# Patient Record
Sex: FEMALE | Race: WHITE | NOT HISPANIC OR LATINO | Employment: OTHER | URBAN - METROPOLITAN AREA
[De-identification: names, ages, dates, MRNs, and addresses within clinical notes are randomized per-mention and may not be internally consistent; named-entity substitution may affect disease eponyms.]

---

## 2017-06-28 ENCOUNTER — TELEPHONE (OUTPATIENT)
Dept: FAMILY MEDICINE | Facility: CLINIC | Age: 77
End: 2017-06-28

## 2017-06-28 NOTE — TELEPHONE ENCOUNTER
----- Message from Hien Gaye sent at 6/28/2017  2:07 PM CDT -----  Contact: self, [phone]  Patient has an appointment scheduled on 10/2 but states she was told to come back in February and requests to be seen sooner if possible. Please advise.

## 2017-06-28 NOTE — TELEPHONE ENCOUNTER
Called patient and offered earlier appointment with another Dr. Patient decided to wait and is requesting to get on waiting list. Patient verbalized understanding.

## 2021-01-26 ENCOUNTER — HOSPITAL ENCOUNTER (EMERGENCY)
Facility: HOSPITAL | Age: 81
Discharge: HOME | End: 2021-01-26
Attending: EMERGENCY MEDICINE
Payer: MEDICARE

## 2021-01-26 VITALS
HEIGHT: 63 IN | OXYGEN SATURATION: 96 % | WEIGHT: 98 LBS | HEART RATE: 89 BPM | BODY MASS INDEX: 17.36 KG/M2 | TEMPERATURE: 98 F | RESPIRATION RATE: 17 BRPM | DIASTOLIC BLOOD PRESSURE: 81 MMHG | SYSTOLIC BLOOD PRESSURE: 176 MMHG
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
## 2021-01-26 DIAGNOSIS — M79.641 RIGHT HAND PAIN: ICD-10-CM

## 2021-01-26 DIAGNOSIS — R51.9 FRONTAL HEADACHE: ICD-10-CM

## 2021-01-26 DIAGNOSIS — M25.572 LEFT LATERAL ANKLE PAIN: ICD-10-CM

## 2021-01-26 DIAGNOSIS — M25.551 RIGHT HIP PAIN: ICD-10-CM

## 2021-01-26 DIAGNOSIS — M25.561 RIGHT KNEE PAIN: ICD-10-CM

## 2021-01-26 PROCEDURE — 99285 EMERGENCY DEPT VISIT HI MDM: CPT | Mod: 25

## 2021-01-26 PROCEDURE — 25000003 PHARM REV CODE 250: Performed by: EMERGENCY MEDICINE

## 2021-01-26 RX ORDER — IBUPROFEN 600 MG/1
600 TABLET ORAL
Status: COMPLETED | OUTPATIENT
Start: 2021-01-26 | End: 2021-01-26

## 2021-01-26 RX ADMIN — IBUPROFEN 600 MG: 600 TABLET, FILM COATED ORAL at 02:01

## 2021-08-25 ENCOUNTER — LAB VISIT (OUTPATIENT)
Dept: LAB | Facility: HOSPITAL | Age: 81
End: 2021-08-25
Attending: FAMILY MEDICINE
Payer: MEDICARE

## 2021-08-25 DIAGNOSIS — D64.9 ANEMIA, UNSPECIFIED TYPE: ICD-10-CM

## 2021-08-25 DIAGNOSIS — I10 BENIGN ESSENTIAL HYPERTENSION: ICD-10-CM

## 2021-08-25 LAB
ALBUMIN SERPL BCP-MCNC: 3.8 G/DL (ref 3.5–5.2)
ALP SERPL-CCNC: 64 U/L (ref 55–135)
ALT SERPL W/O P-5'-P-CCNC: 9 U/L (ref 10–44)
ANION GAP SERPL CALC-SCNC: 10 MMOL/L (ref 8–16)
AST SERPL-CCNC: 16 U/L (ref 10–40)
BASOPHILS # BLD AUTO: 0.03 K/UL (ref 0–0.2)
BASOPHILS NFR BLD: 0.5 % (ref 0–1.9)
BILIRUB SERPL-MCNC: 0.4 MG/DL (ref 0.1–1)
BUN SERPL-MCNC: 26 MG/DL (ref 8–23)
CALCIUM SERPL-MCNC: 9.7 MG/DL (ref 8.7–10.5)
CHLORIDE SERPL-SCNC: 107 MMOL/L (ref 95–110)
CO2 SERPL-SCNC: 25 MMOL/L (ref 23–29)
CREAT SERPL-MCNC: 1.1 MG/DL (ref 0.5–1.4)
DIFFERENTIAL METHOD: ABNORMAL
EOSINOPHIL # BLD AUTO: 0.1 K/UL (ref 0–0.5)
EOSINOPHIL NFR BLD: 0.9 % (ref 0–8)
ERYTHROCYTE [DISTWIDTH] IN BLOOD BY AUTOMATED COUNT: 12.5 % (ref 11.5–14.5)
EST. GFR  (AFRICAN AMERICAN): 54 ML/MIN/1.73 M^2
EST. GFR  (NON AFRICAN AMERICAN): 47 ML/MIN/1.73 M^2
FERRITIN SERPL-MCNC: 198 NG/ML (ref 20–300)
FOLATE SERPL-MCNC: 10.8 NG/ML (ref 4–24)
GLUCOSE SERPL-MCNC: 110 MG/DL (ref 70–110)
HCT VFR BLD AUTO: 33.5 % (ref 37–48.5)
HGB BLD-MCNC: 10.6 G/DL (ref 12–16)
IMM GRANULOCYTES # BLD AUTO: 0.02 K/UL (ref 0–0.04)
IMM GRANULOCYTES NFR BLD AUTO: 0.3 % (ref 0–0.5)
IRON SERPL-MCNC: 64 UG/DL (ref 30–160)
LYMPHOCYTES # BLD AUTO: 1.6 K/UL (ref 1–4.8)
LYMPHOCYTES NFR BLD: 28 % (ref 18–48)
MCH RBC QN AUTO: 29.9 PG (ref 27–31)
MCHC RBC AUTO-ENTMCNC: 31.6 G/DL (ref 32–36)
MCV RBC AUTO: 95 FL (ref 82–98)
MONOCYTES # BLD AUTO: 0.4 K/UL (ref 0.3–1)
MONOCYTES NFR BLD: 6.8 % (ref 4–15)
NEUTROPHILS # BLD AUTO: 3.6 K/UL (ref 1.8–7.7)
NEUTROPHILS NFR BLD: 63.5 % (ref 38–73)
NRBC BLD-RTO: 0 /100 WBC
PLATELET # BLD AUTO: 189 K/UL (ref 150–450)
PMV BLD AUTO: 9.5 FL (ref 9.2–12.9)
POTASSIUM SERPL-SCNC: 4 MMOL/L (ref 3.5–5.1)
PROT SERPL-MCNC: 7.4 G/DL (ref 6–8.4)
RBC # BLD AUTO: 3.54 M/UL (ref 4–5.4)
SATURATED IRON: 18 % (ref 20–50)
SODIUM SERPL-SCNC: 142 MMOL/L (ref 136–145)
TOTAL IRON BINDING CAPACITY: 355 UG/DL (ref 250–450)
TRANSFERRIN SERPL-MCNC: 240 MG/DL (ref 200–375)
TSH SERPL DL<=0.005 MIU/L-ACNC: 1.58 UIU/ML (ref 0.4–4)
VIT B12 SERPL-MCNC: 343 PG/ML (ref 210–950)
WBC # BLD AUTO: 5.74 K/UL (ref 3.9–12.7)

## 2021-08-25 PROCEDURE — 82746 ASSAY OF FOLIC ACID SERUM: CPT | Performed by: FAMILY MEDICINE

## 2021-08-25 PROCEDURE — 80053 COMPREHEN METABOLIC PANEL: CPT | Performed by: FAMILY MEDICINE

## 2021-08-25 PROCEDURE — 82607 VITAMIN B-12: CPT | Performed by: FAMILY MEDICINE

## 2021-08-25 PROCEDURE — 83540 ASSAY OF IRON: CPT | Performed by: FAMILY MEDICINE

## 2021-08-25 PROCEDURE — 84443 ASSAY THYROID STIM HORMONE: CPT | Performed by: FAMILY MEDICINE

## 2021-08-25 PROCEDURE — 85025 COMPLETE CBC W/AUTO DIFF WBC: CPT | Performed by: FAMILY MEDICINE

## 2021-08-25 PROCEDURE — 82728 ASSAY OF FERRITIN: CPT | Performed by: FAMILY MEDICINE

## 2021-08-25 PROCEDURE — 36415 COLL VENOUS BLD VENIPUNCTURE: CPT | Performed by: FAMILY MEDICINE

## 2021-12-22 ENCOUNTER — HOSPITAL ENCOUNTER (EMERGENCY)
Facility: HOSPITAL | Age: 81
Discharge: HOME | End: 2021-12-23
Attending: EMERGENCY MEDICINE
Payer: MEDICARE

## 2021-12-22 VITALS
WEIGHT: 90 LBS | SYSTOLIC BLOOD PRESSURE: 157 MMHG | RESPIRATION RATE: 16 BRPM | BODY MASS INDEX: 15.95 KG/M2 | TEMPERATURE: 98 F | DIASTOLIC BLOOD PRESSURE: 69 MMHG | HEART RATE: 105 BPM | HEIGHT: 63 IN | OXYGEN SATURATION: 99 %

## 2021-12-22 DIAGNOSIS — R55 VASOVAGAL SYNCOPE: Primary | ICD-10-CM

## 2021-12-22 LAB
ALBUMIN SERPL BCP-MCNC: 3.8 G/DL (ref 3.5–5.2)
ALP SERPL-CCNC: 82 U/L (ref 55–135)
ALT SERPL W/O P-5'-P-CCNC: 7 U/L (ref 10–44)
ANION GAP SERPL CALC-SCNC: 12 MMOL/L (ref 8–16)
AST SERPL-CCNC: 15 U/L (ref 10–40)
BASOPHILS # BLD AUTO: 0.03 K/UL (ref 0–0.2)
BASOPHILS NFR BLD: 0.5 % (ref 0–1.9)
BILIRUB SERPL-MCNC: 0.4 MG/DL (ref 0.1–1)
BILIRUB UR QL STRIP: NEGATIVE
BUN SERPL-MCNC: 36 MG/DL (ref 8–23)
CALCIUM SERPL-MCNC: 10.2 MG/DL (ref 8.7–10.5)
CHLORIDE SERPL-SCNC: 104 MMOL/L (ref 95–110)
CLARITY UR REFRACT.AUTO: CLEAR
CO2 SERPL-SCNC: 21 MMOL/L (ref 23–29)
COLOR UR AUTO: YELLOW
CREAT SERPL-MCNC: 1.3 MG/DL (ref 0.5–1.4)
DIFFERENTIAL METHOD: ABNORMAL
EOSINOPHIL # BLD AUTO: 0 K/UL (ref 0–0.5)
EOSINOPHIL NFR BLD: 0.7 % (ref 0–8)
ERYTHROCYTE [DISTWIDTH] IN BLOOD BY AUTOMATED COUNT: 12.4 % (ref 11.5–14.5)
EST. GFR  (AFRICAN AMERICAN): 43.8 ML/MIN/1.73 M^2
EST. GFR  (NON AFRICAN AMERICAN): 38 ML/MIN/1.73 M^2
GLUCOSE SERPL-MCNC: 122 MG/DL (ref 70–110)
GLUCOSE UR QL STRIP: NEGATIVE
HCT VFR BLD AUTO: 38 % (ref 37–48.5)
HGB BLD-MCNC: 11.8 G/DL (ref 12–16)
HGB UR QL STRIP: ABNORMAL
HYALINE CASTS UR QL AUTO: 8 /LPF
IMM GRANULOCYTES # BLD AUTO: 0.01 K/UL (ref 0–0.04)
IMM GRANULOCYTES NFR BLD AUTO: 0.2 % (ref 0–0.5)
KETONES UR QL STRIP: NEGATIVE
LEUKOCYTE ESTERASE UR QL STRIP: NEGATIVE
LYMPHOCYTES # BLD AUTO: 1.9 K/UL (ref 1–4.8)
LYMPHOCYTES NFR BLD: 31.2 % (ref 18–48)
MAGNESIUM SERPL-MCNC: 1.5 MG/DL (ref 1.6–2.6)
MCH RBC QN AUTO: 29.7 PG (ref 27–31)
MCHC RBC AUTO-ENTMCNC: 31.1 G/DL (ref 32–36)
MCV RBC AUTO: 96 FL (ref 82–98)
MICROSCOPIC COMMENT: ABNORMAL
MONOCYTES # BLD AUTO: 0.5 K/UL (ref 0.3–1)
MONOCYTES NFR BLD: 7.5 % (ref 4–15)
NEUTROPHILS # BLD AUTO: 3.6 K/UL (ref 1.8–7.7)
NEUTROPHILS NFR BLD: 59.9 % (ref 38–73)
NITRITE UR QL STRIP: NEGATIVE
NRBC BLD-RTO: 0 /100 WBC
PH UR STRIP: 5 [PH] (ref 5–8)
PLATELET # BLD AUTO: 193 K/UL (ref 150–450)
PMV BLD AUTO: 9.9 FL (ref 9.2–12.9)
POTASSIUM SERPL-SCNC: 3.5 MMOL/L (ref 3.5–5.1)
PROT SERPL-MCNC: 7.6 G/DL (ref 6–8.4)
PROT UR QL STRIP: NEGATIVE
RBC # BLD AUTO: 3.97 M/UL (ref 4–5.4)
RBC #/AREA URNS AUTO: 1 /HPF (ref 0–4)
SODIUM SERPL-SCNC: 137 MMOL/L (ref 136–145)
SP GR UR STRIP: 1.02 (ref 1–1.03)
SQUAMOUS #/AREA URNS AUTO: 0 /HPF
TROPONIN I SERPL DL<=0.01 NG/ML-MCNC: <0.006 NG/ML (ref 0–0.03)
URN SPEC COLLECT METH UR: ABNORMAL
WBC # BLD AUTO: 6.02 K/UL (ref 3.9–12.7)

## 2021-12-22 PROCEDURE — 25000003 PHARM REV CODE 250: Performed by: EMERGENCY MEDICINE

## 2021-12-22 PROCEDURE — 85025 COMPLETE CBC W/AUTO DIFF WBC: CPT | Performed by: EMERGENCY MEDICINE

## 2021-12-22 PROCEDURE — 81001 URINALYSIS AUTO W/SCOPE: CPT | Performed by: EMERGENCY MEDICINE

## 2021-12-22 PROCEDURE — 99284 EMERGENCY DEPT VISIT MOD MDM: CPT | Mod: 25

## 2021-12-22 PROCEDURE — 96361 HYDRATE IV INFUSION ADD-ON: CPT

## 2021-12-22 PROCEDURE — 80053 COMPREHEN METABOLIC PANEL: CPT | Performed by: EMERGENCY MEDICINE

## 2021-12-22 PROCEDURE — 84484 ASSAY OF TROPONIN QUANT: CPT | Performed by: EMERGENCY MEDICINE

## 2021-12-22 PROCEDURE — 96365 THER/PROPH/DIAG IV INF INIT: CPT

## 2021-12-22 PROCEDURE — 83735 ASSAY OF MAGNESIUM: CPT | Performed by: EMERGENCY MEDICINE

## 2021-12-22 PROCEDURE — 99285 PR EMERGENCY DEPT VISIT,LEVEL V: ICD-10-PCS | Mod: ,,, | Performed by: EMERGENCY MEDICINE

## 2021-12-22 PROCEDURE — 63600175 PHARM REV CODE 636 W HCPCS: Performed by: EMERGENCY MEDICINE

## 2021-12-22 PROCEDURE — 99285 EMERGENCY DEPT VISIT HI MDM: CPT | Mod: ,,, | Performed by: EMERGENCY MEDICINE

## 2021-12-22 RX ORDER — MAGNESIUM SULFATE HEPTAHYDRATE 40 MG/ML
2 INJECTION, SOLUTION INTRAVENOUS
Status: COMPLETED | OUTPATIENT
Start: 2021-12-22 | End: 2021-12-22

## 2021-12-22 RX ADMIN — SODIUM CHLORIDE 500 ML: 0.9 INJECTION, SOLUTION INTRAVENOUS at 10:12

## 2021-12-22 RX ADMIN — MAGNESIUM SULFATE 2 G: 2 INJECTION INTRAVENOUS at 09:12

## 2021-12-22 RX ADMIN — SODIUM CHLORIDE 500 ML: 0.9 INJECTION, SOLUTION INTRAVENOUS at 08:12

## 2021-12-23 PROBLEM — N18.32 STAGE 3B CHRONIC KIDNEY DISEASE: Status: ACTIVE | Noted: 2021-12-23

## 2021-12-23 PROBLEM — S32.000A COMPRESSION FRACTURE OF LUMBAR VERTEBRA: Status: ACTIVE | Noted: 2021-12-23

## 2021-12-23 PROBLEM — N18.32 STAGE 3B CHRONIC KIDNEY DISEASE: Chronic | Status: ACTIVE | Noted: 2021-12-23

## 2021-12-23 NOTE — PROVIDER PROGRESS NOTES - EMERGENCY DEPT.
Encounter Date: 12/22/2021    ED Physician Progress Notes        Physician Note:   Received signout from Dr. Kapoor.  Plan was follow-up urine and reassess.  Patient resting in bed no acute distress labs reviewed no acute process identified.  Patient sources a history of syncope in reports this is similar.  Family reports patient Had recent echo which was reassuring.  Family also reports patient is recovering from diarrheal illness, possibly dehydrated from that.  She denies any complaints at this time.  Ready for discharge.  Discussed with family results.  Patient will be discharged strict return precautions discussed will follow-up with PCP.

## 2021-12-23 NOTE — ED TRIAGE NOTES
"Pt presents to the ED via EMS for syncopal episode she had today while [place] shopping. Per daughter: "She told me she wasn't feeling good then she just fell into my arms". Daughter said she was down for a few minutes. EMS took an EKG en route and it showed she had a STEMI, EKG on arrival did not show this. Denies chest pain, SOB, dizziness, or lightheadedness. Had a syncopal episode in 2015.   "

## 2021-12-23 NOTE — ED PROVIDER NOTES
Encounter Date: 12/22/2021       History     Chief Complaint   Patient presents with    Loss of Consciousness     [place] mall with [place]. They reported pt had syncopal episode 1.5 hours ago. She was awake upon EMS arrival. She denies any complaints or pain, head injury.      A 81-year-old female was at the mall today when she had a syncopal episode.  Immediately before episode daughter says she felt bad and had diaphoresis.  Had a bowel movement in her pants.  Patient had exerted herself just prior to this.  Patient feels well now.  She denies she ever had nausea, vomiting, diarrhea, fever, cough, shortness of breath, chest pain, abdominal pain, or dysuria.  Has not been drinking much today.  No previous episodes.  A ten point review of systems was completed and is negative except as documented above.  Patient denies any other acute medical complaint.  The patients available PMH, PSH, Social History, medications, allergies, and triage vital signs were reviewed just prior to their medical evaluation.  Brought in by EMS.  No interventions in route.        Review of patient's allergies indicates:   Allergen Reactions    Darvocet a500 [propoxyphene n-acetaminophen] Other (See Comments)     hallucinations    Lortab [hydrocodone-acetaminophen] Hives    Tramadol      Past Medical History:   Diagnosis Date    Frozen shoulder     History of atrial fibrillation     History of torn meniscus of left knee     Hypertension     Osteoporosis      Past Surgical History:   Procedure Laterality Date    ANKLE FRACTURE SURGERY  2009    right    CHOLECYSTECTOMY  2008     Family History   Problem Relation Age of Onset    Heart disease Father     Hypertension Father     Rheumatic fever Sister     Hypertension Sister     Heart disease Brother     Peripheral vascular disease Brother     Hypertension Brother     Diabetes type II Sister     Hypertension Sister     Heart disease Sister      Social  History     Tobacco Use    Smoking status: Never Smoker    Smokeless tobacco: Never Used   Substance Use Topics    Alcohol use: No     Review of Systems   Constitutional: Negative for fever.   HENT: Negative for sore throat.    Eyes: Negative for visual disturbance.   Respiratory: Negative for cough and shortness of breath.    Cardiovascular: Negative for chest pain.   Gastrointestinal: Negative for abdominal pain, diarrhea, nausea and vomiting.   Genitourinary: Negative for dysuria.   Musculoskeletal: Negative for neck pain.   Skin: Negative for rash and wound.   Allergic/Immunologic: Negative for immunocompromised state.   Neurological: Positive for syncope.   Psychiatric/Behavioral: Negative for confusion.       Physical Exam     Initial Vitals   BP Pulse Resp Temp SpO2   12/22/21 1959 12/22/21 1959 12/22/21 1959 12/22/21 2015 12/22/21 1959   (!) 156/82 91 16 97.7 °F (36.5 °C) 98 %      MAP       --                Physical Exam    Nursing note and vitals reviewed.  Constitutional: She appears well-developed and well-nourished. She is not diaphoretic. No distress.   HENT:   Head: Normocephalic and atraumatic.   Nose: Nose normal.   Eyes: Conjunctivae are normal. Right eye exhibits no discharge. Left eye exhibits no discharge.   Neck: Neck supple.   Normal range of motion.  Cardiovascular: Normal rate, regular rhythm and normal heart sounds. Exam reveals no gallop and no friction rub.    No murmur heard.  Pulmonary/Chest: Breath sounds normal. No respiratory distress. She has no wheezes. She has no rhonchi. She has no rales.   Abdominal: Abdomen is soft. She exhibits no distension. There is no abdominal tenderness. There is no rebound and no guarding.   Musculoskeletal:         General: No tenderness or edema. Normal range of motion.      Cervical back: Normal range of motion and neck supple.     Neurological: She is alert and oriented to person, place, and time. She has normal strength. GCS score is 15. GCS eye  subscore is 4. GCS verbal subscore is 5. GCS motor subscore is 6.   Hard of hearing   Skin: Skin is warm and dry. No rash noted. No erythema.   Psychiatric: She has a normal mood and affect. Her behavior is normal. Judgment and thought content normal.         ED Course   Procedures  Labs Reviewed   CBC W/ AUTO DIFFERENTIAL - Abnormal; Notable for the following components:       Result Value    RBC 3.97 (*)     Hemoglobin 11.8 (*)     MCHC 31.1 (*)     All other components within normal limits   COMPREHENSIVE METABOLIC PANEL - Abnormal; Notable for the following components:    CO2 21 (*)     Glucose 122 (*)     BUN 36 (*)     ALT 7 (*)     eGFR if  43.8 (*)     eGFR if non  38.0 (*)     All other components within normal limits   MAGNESIUM - Abnormal; Notable for the following components:    Magnesium 1.5 (*)     All other components within normal limits   TROPONIN I   URINALYSIS, REFLEX TO URINE CULTURE     EKG Readings: (Independently Interpreted)   Initial Reading: No STEMI. Rhythm: Normal Sinus Rhythm. Heart Rate: 94. Ectopy: No Ectopy. Conduction: Normal.   LAE, LVH       Imaging Results    None          Medications   sodium chloride 0.9% bolus 500 mL (500 mLs Intravenous New Bag 12/22/21 2230)   sodium chloride 0.9% bolus 500 mL (0 mLs Intravenous Stopped 12/22/21 2125)   magnesium sulfate 2g in water 50mL IVPB (premix) (0 g Intravenous Stopped 12/22/21 2141)     Medical Decision Making:   History:   I obtained history from: someone other than patient and EMS provider.       <> Summary of History: Daughter assists HPI  Old Medical Records: I decided to obtain old medical records.  Independently Interpreted Test(s):   I have ordered and independently interpreted EKG Reading(s) - see prior notes  Clinical Tests:   Lab Tests: Ordered and Reviewed  Medical Tests: Ordered and Reviewed  ED Management:  81-year-old female presents after syncopal episode.  Vitals with hypertension.   Physical exam as above.  EKG without arrhythmia.  Blood work unremarkable.  Pending UA.  At baseline in ED.  Vasovagal episode likely.  Turned over to Dr. Mclaughlin pending UA and then likely DC.  Did bedside teaching.  All questions answered.  Patient acknowledges understanding.  Other:   I have discussed this case with another health care provider.       <> Summary of the Discussion: above                      Clinical Impression:   Final diagnoses:  [R55] Vasovagal syncope (Primary)          ED Disposition Condition    Discharge Stable        ED Prescriptions     None        Follow-up Information     Follow up With Specialties Details Why Contact Info    Follow up with primary physician as soon as possible.  Call tomorrow for an appointment.        [facility] Emergency Medicine  Return to ED for worsening symptoms, inability to eat/drink, fever greater than 100.4, or any other concerns. [address]  [facility] [id number]  [phone]           Son Kapoor MD  12/22/21 [id number]       Son Kapoor MD  12/22/21 [id number]

## 2023-05-20 ENCOUNTER — HOSPITAL ENCOUNTER (EMERGENCY)
Facility: HOSPITAL | Age: 83
Discharge: HOME | End: 2023-05-20
Attending: STUDENT IN AN ORGANIZED HEALTH CARE EDUCATION/TRAINING PROGRAM
Payer: MEDICARE

## 2023-05-20 VITALS
HEART RATE: 60 BPM | RESPIRATION RATE: 13 BRPM | DIASTOLIC BLOOD PRESSURE: 71 MMHG | SYSTOLIC BLOOD PRESSURE: 165 MMHG | TEMPERATURE: 98 F | OXYGEN SATURATION: 96 %

## 2023-05-20 DIAGNOSIS — S22.000A COMPRESSION FRACTURE OF THORACIC VERTEBRA, UNSPECIFIED THORACIC VERTEBRAL LEVEL, INITIAL ENCOUNTER: ICD-10-CM

## 2023-05-20 DIAGNOSIS — S32.000S COMPRESSION FRACTURE OF LUMBAR VERTEBRA, UNSPECIFIED LUMBAR VERTEBRAL LEVEL, SEQUELA: Primary | ICD-10-CM
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
## 2023-05-20 DIAGNOSIS — R55 SYNCOPE: ICD-10-CM

## 2023-05-20 DIAGNOSIS — R55 SYNCOPE AND COLLAPSE: ICD-10-CM

## 2023-05-20 LAB
ALBUMIN SERPL BCP-MCNC: 3.8 G/DL (ref 3.5–5.2)
ALP SERPL-CCNC: 66 U/L (ref 55–135)
ALT SERPL W/O P-5'-P-CCNC: 10 U/L (ref 10–44)
ANION GAP SERPL CALC-SCNC: 12 MMOL/L (ref 8–16)
AST SERPL-CCNC: 17 U/L (ref 10–40)
BACTERIA #/AREA URNS HPF: ABNORMAL /HPF
BASOPHILS # BLD AUTO: 0.02 K/UL (ref 0–0.2)
BASOPHILS NFR BLD: 0.2 % (ref 0–1.9)
BILIRUB SERPL-MCNC: 0.4 MG/DL (ref 0.1–1)
BILIRUB UR QL STRIP: NEGATIVE
BUN SERPL-MCNC: 17 MG/DL (ref 8–23)
CALCIUM SERPL-MCNC: 9.3 MG/DL (ref 8.7–10.5)
CHLORIDE SERPL-SCNC: 105 MMOL/L (ref 95–110)
CLARITY UR: ABNORMAL
CO2 SERPL-SCNC: 24 MMOL/L (ref 23–29)
COLOR UR: YELLOW
CREAT SERPL-MCNC: 1 MG/DL (ref 0.5–1.4)
DIFFERENTIAL METHOD: ABNORMAL
EOSINOPHIL # BLD AUTO: 0 K/UL (ref 0–0.5)
EOSINOPHIL NFR BLD: 0.1 % (ref 0–8)
ERYTHROCYTE [DISTWIDTH] IN BLOOD BY AUTOMATED COUNT: 12.4 % (ref 11.5–14.5)
EST. GFR  (NO RACE VARIABLE): 56 ML/MIN/1.73 M^2
GLUCOSE SERPL-MCNC: 125 MG/DL (ref 70–110)
GLUCOSE UR QL STRIP: ABNORMAL
HCT VFR BLD AUTO: 34.6 % (ref 37–48.5)
HGB BLD-MCNC: 11.3 G/DL (ref 12–16)
HGB UR QL STRIP: ABNORMAL
HYALINE CASTS #/AREA URNS LPF: 3 /LPF
IMM GRANULOCYTES # BLD AUTO: 0.08 K/UL (ref 0–0.04)
IMM GRANULOCYTES NFR BLD AUTO: 0.9 % (ref 0–0.5)
KETONES UR QL STRIP: NEGATIVE
LEUKOCYTE ESTERASE UR QL STRIP: NEGATIVE
LYMPHOCYTES # BLD AUTO: 0.9 K/UL (ref 1–4.8)
LYMPHOCYTES NFR BLD: 10.1 % (ref 18–48)
MCH RBC QN AUTO: 30 PG (ref 27–31)
MCHC RBC AUTO-ENTMCNC: 32.7 G/DL (ref 32–36)
MCV RBC AUTO: 92 FL (ref 82–98)
MICROSCOPIC COMMENT: ABNORMAL
MONOCYTES # BLD AUTO: 0.4 K/UL (ref 0.3–1)
MONOCYTES NFR BLD: 4.5 % (ref 4–15)
NEUTROPHILS # BLD AUTO: 7.3 K/UL (ref 1.8–7.7)
NEUTROPHILS NFR BLD: 84.2 % (ref 38–73)
NITRITE UR QL STRIP: NEGATIVE
NRBC BLD-RTO: 0 /100 WBC
PH UR STRIP: 6 [PH] (ref 5–8)
PLATELET # BLD AUTO: 153 K/UL (ref 150–450)
PMV BLD AUTO: 10 FL (ref 9.2–12.9)
POCT GLUCOSE: 127 MG/DL (ref 70–110)
POTASSIUM SERPL-SCNC: 3 MMOL/L (ref 3.5–5.1)
PROT SERPL-MCNC: 7 G/DL (ref 6–8.4)
PROT UR QL STRIP: ABNORMAL
RBC # BLD AUTO: 3.77 M/UL (ref 4–5.4)
RBC #/AREA URNS HPF: 4 /HPF (ref 0–4)
SODIUM SERPL-SCNC: 141 MMOL/L (ref 136–145)
SP GR UR STRIP: 1.02 (ref 1–1.03)
SQUAMOUS #/AREA URNS HPF: 1 /HPF
TROPONIN I SERPL DL<=0.01 NG/ML-MCNC: <0.006 NG/ML (ref 0–0.03)
TROPONIN I SERPL DL<=0.01 NG/ML-MCNC: <0.006 NG/ML (ref 0–0.03)
URN SPEC COLLECT METH UR: ABNORMAL
UROBILINOGEN UR STRIP-ACNC: NEGATIVE EU/DL
WBC # BLD AUTO: 8.68 K/UL (ref 3.9–12.7)
WBC #/AREA URNS HPF: 8 /HPF (ref 0–5)

## 2023-05-20 PROCEDURE — 63600175 PHARM REV CODE 636 W HCPCS: Performed by: STUDENT IN AN ORGANIZED HEALTH CARE EDUCATION/TRAINING PROGRAM

## 2023-05-20 PROCEDURE — 80053 COMPREHEN METABOLIC PANEL: CPT | Performed by: NURSE PRACTITIONER

## 2023-05-20 PROCEDURE — 93005 ELECTROCARDIOGRAM TRACING: CPT

## 2023-05-20 PROCEDURE — 96368 THER/DIAG CONCURRENT INF: CPT

## 2023-05-20 PROCEDURE — 93010 ELECTROCARDIOGRAM REPORT: CPT | Mod: 76,,, | Performed by: INTERNAL MEDICINE

## 2023-05-20 PROCEDURE — 82962 GLUCOSE BLOOD TEST: CPT

## 2023-05-20 PROCEDURE — 96365 THER/PROPH/DIAG IV INF INIT: CPT

## 2023-05-20 PROCEDURE — 96366 THER/PROPH/DIAG IV INF ADDON: CPT

## 2023-05-20 PROCEDURE — 84484 ASSAY OF TROPONIN QUANT: CPT | Mod: 91 | Performed by: NURSE PRACTITIONER

## 2023-05-20 PROCEDURE — 93010 ELECTROCARDIOGRAM REPORT: CPT | Mod: ,,, | Performed by: INTERNAL MEDICINE

## 2023-05-20 PROCEDURE — 99285 EMERGENCY DEPT VISIT HI MDM: CPT | Mod: 25

## 2023-05-20 PROCEDURE — 84484 ASSAY OF TROPONIN QUANT: CPT | Performed by: STUDENT IN AN ORGANIZED HEALTH CARE EDUCATION/TRAINING PROGRAM

## 2023-05-20 PROCEDURE — 85025 COMPLETE CBC W/AUTO DIFF WBC: CPT | Performed by: NURSE PRACTITIONER

## 2023-05-20 PROCEDURE — 81000 URINALYSIS NONAUTO W/SCOPE: CPT | Performed by: NURSE PRACTITIONER

## 2023-05-20 PROCEDURE — 93010 EKG 12-LEAD: ICD-10-PCS | Mod: 76,,, | Performed by: INTERNAL MEDICINE

## 2023-05-20 PROCEDURE — 25000003 PHARM REV CODE 250: Performed by: STUDENT IN AN ORGANIZED HEALTH CARE EDUCATION/TRAINING PROGRAM

## 2023-05-20 RX ORDER — ACETAMINOPHEN 500 MG
1000 TABLET ORAL
Status: COMPLETED | OUTPATIENT
Start: 2023-05-20 | End: 2023-05-20

## 2023-05-20 RX ORDER — POTASSIUM CHLORIDE 7.45 MG/ML
10 INJECTION INTRAVENOUS
Status: COMPLETED | OUTPATIENT
Start: 2023-05-20 | End: 2023-05-20

## 2023-05-20 RX ORDER — POTASSIUM CHLORIDE 20 MEQ/1
40 TABLET, EXTENDED RELEASE ORAL
Status: COMPLETED | OUTPATIENT
Start: 2023-05-20 | End: 2023-05-20

## 2023-05-20 RX ORDER — MAGNESIUM SULFATE HEPTAHYDRATE 40 MG/ML
2 INJECTION, SOLUTION INTRAVENOUS ONCE
Status: COMPLETED | OUTPATIENT
Start: 2023-05-20 | End: 2023-05-20

## 2023-05-20 RX ADMIN — MAGNESIUM SULFATE 2 G: 2 INJECTION INTRAVENOUS at 08:05

## 2023-05-20 RX ADMIN — POTASSIUM CHLORIDE 40 MEQ: 1500 TABLET, EXTENDED RELEASE ORAL at 08:05

## 2023-05-20 RX ADMIN — POTASSIUM CHLORIDE 10 MEQ: 7.46 INJECTION, SOLUTION INTRAVENOUS at 08:05

## 2023-05-20 RX ADMIN — SODIUM CHLORIDE 1000 ML: 0.9 INJECTION, SOLUTION INTRAVENOUS at 08:05

## 2023-05-20 RX ADMIN — ACETAMINOPHEN 1000 MG: 500 TABLET ORAL at 08:05

## 2023-05-20 NOTE — ED NOTES
Patient verbalized understanding of status and plan of care.  Daughter at bedside.  Patient side rails are up x 2, bed is low and locked, call light is in reach   Cardiac monitor (alarms on, set, and audible), pulse oximeter, and automatic blood pressure cuff applied.   Will continue to monitor.

## 2023-05-20 NOTE — FIRST PROVIDER EVALUATION
Emergency Department TeleTriage Encounter Note      CHIEF COMPLAINT    Chief Complaint   Patient presents with    Loss of Consciousness     Pt reports after taking a shower she had 2 episodes of syncopal events and landed prone, + hit head, + HA, denies use of blood thinners, + back pain, hx of arthritis, + right foot pain        VITAL SIGNS   Initial Vitals [05/20/23 1634]   BP Pulse Resp Temp SpO2   (!) 125/59 88 20 97.7 °F (36.5 °C) 96 %      MAP       --            ALLERGIES    Review of patient's allergies indicates:   Allergen Reactions    Darvocet a500 [propoxyphene n-acetaminophen] Other (See Comments)     hallucinations    Lortab [hydrocodone-acetaminophen] Hives    Tramadol        PROVIDER TRIAGE NOTE  This is a teletriage evaluation of a 82 y.o. female presenting to the ED complaining of fall.  Pt's family member reports pmhx of recurrent syncope. Today while walking, pt passed out and fell face first onto the floor.  Pt then awoke and had another syncopal episode.  Did hit head. Reports headache and facial pain.  Pmhx of osteoporosis per family member. Pt reports foot and chest pain since falling. Family member reported pt was mildly confused.     Pt is alert, no distress. Bruising to face. .     Initial orders will be placed and care will be transferred to an alternate provider when patient is roomed for a full evaluation. Any additional orders and the final disposition will be determined by that provider.         ORDERS  Labs Reviewed   CBC W/ AUTO DIFFERENTIAL   COMPREHENSIVE METABOLIC PANEL   TROPONIN I   URINALYSIS, REFLEX TO URINE CULTURE   POCT GLUCOSE MONITORING CONTINUOUS       ED Orders (720h ago, onward)      Start Ordered     Status Ordering Provider    05/20/23 1800 05/20/23 1646  Vital Signs  Every 2 hours         Ordered LUKE CRUZ N.    05/20/23 1647 05/20/23 1646  Apply cervical collar  Once         Ordered LUKE CRUZ N.    05/20/23 1646 05/20/23 1646  X-Ray  Chest PA And Lateral  1 time imaging         Ordered SCHOTTELKOTTE, LUKE N.    05/20/23 1646 05/20/23 1646  X-Ray Foot Complete Right  1 time imaging         Ordered SCHOTTELKOTTE, LUKE N.    05/20/23 1645 05/20/23 1646  CT Head Without Contrast  1 time imaging         Ordered SCHOTTELKOTTE, LUKE N.    05/20/23 1645 05/20/23 1646  CT Maxillofacial Without Contrast  1 time imaging         Ordered SCHOTTELKOTTE, LUKE N.    05/20/23 1645 05/20/23 1646  CT Cervical Spine Without Contrast  1 time imaging         Ordered SCHOTTELKOTTE, LUKE N.    05/20/23 1645 05/20/23 1646  CBC auto differential  STAT         Ordered SCHOTTELDELILAHE, LUKE N.    05/20/23 1645 05/20/23 1646  Comprehensive metabolic panel  STAT         Ordered SCHCRUZE, LUKE N.    05/20/23 1645 05/20/23 1646  Insert Saline lock IV  Once         Ordered SCHOTTMANAE, LUKE N.    05/20/23 1645 05/20/23 1646  EKG 12-lead  Once         Ordered SCHOTTMANAE, LUKE N.    05/20/23 1645 05/20/23 1646  POCT glucose  Once         Ordered SCHOTTELIRENETTE, LUKE N.    05/20/23 1645 05/20/23 1646  Cardiac Monitoring - Adult  Continuous        Comments: Notify Physician If:    Ordered SCHOTTADELINATTE, LUKE N.    05/20/23 1645 05/20/23 1646  Pulse Oximetry Continuous  Continuous         Ordered SCHOTTELIRENETTE, LUKE N.    05/20/23 1645 05/20/23 1646  Troponin I  STAT         Ordered SCHCRUZE, LUKE N.    05/20/23 1645 05/20/23 1646  Urinalysis, Reflex to Urine Culture Urine, Clean Catch  STAT         Ordered SCHMARLON SAUNDERSIKA N.              Virtual Visit Note: The provider triage portion of this emergency department evaluation and documentation was performed via [employer], a HIPAA-compliant telemedicine application, in concert with a tele-presenter in the room. A face to face patient evaluation with one of my colleagues will occur once the patient is placed in an emergency department room.      DISCLAIMER: This note was  prepared with [employer] voice recognition transcription software. Garbled syntax, mangled pronouns, and other bizarre constructions may be attributed to that software system.

## 2023-05-21 NOTE — ED PROVIDER NOTES
NAME:  Lacey Mcelroy  CSN:     [id number]  MRN:    [id number]  ADMIT DATE: 5/20/2023        eMERGENCY dEPARTMENT eNCOUnter    CHIEF COMPLAINT    Chief Complaint   Patient presents with    Loss of Consciousness     Pt reports after taking a shower she had 2 episodes of syncopal events and landed prone, + hit head, + HA, denies use of blood thinners, + back pain, hx of arthritis, + right foot pain        HPI    Lacey Mcelroy is a 82 y.o. female with a past medical history of  has a past medical history of Frozen shoulder, History of atrial fibrillation, History of torn meniscus of left knee, Hypertension, and Osteoporosis.     she presents to the ED due to ground level fall, face forward at home. Notes she felt light-headed, diaphoretic, tunnel vision and sustained 1 syncopal episode. Notes a hx of this in the past. Has had diarrhea for the past 2 days. Notes consistent fluid intake, be decrease in food intake for the past day. Pain now to R foot, upper back and over the sturnum. Does not a hx of compression fxs to the lumbar spine-daugher states that primary care has typically been in the 1 to manage this.  Daughters also emphasized that she can not take any narcotic medications as she has hallucinations with this.      HPI       PAST MEDICAL HISTORY  Past Medical History:   Diagnosis Date    Frozen shoulder     History of atrial fibrillation     History of torn meniscus of left knee     Hypertension     Osteoporosis        SURGICAL HISTORY    Past Surgical History:   Procedure Laterality Date    ANKLE FRACTURE SURGERY  2009    right    CHOLECYSTECTOMY  2008       FAMILY HISTORY    Family History   Problem Relation Age of Onset    Heart disease Father     Hypertension Father     Rheumatic fever Sister     Hypertension Sister     Heart disease Brother     Peripheral vascular disease Brother     Hypertension Brother     Diabetes type II Sister     Hypertension Sister     Heart disease Sister        SOCIAL HISTORY     Social History     Socioeconomic History    Marital status:    Tobacco Use    Smoking status: Never    Smokeless tobacco: Never   Substance and Sexual Activity    Alcohol use: No       MEDICATIONS  Current Outpatient Medications   Medication Instructions    acetaminophen (TYLENOL) 500 mg, Oral, Every 6 hours PRN    cholecalciferol, vitamin D3, (VITAMIN D3) 50 mcg (2,000 unit) Cap 1 capsule, Oral, Daily    ferrous sulfate (FEOSOL) Tab tablet 1 each, Oral, With breakfast    ibuprofen (ADVIL,MOTRIN) 200 mg, Oral, Every 6 hours PRN    MINERAL OIL/PETROLATUM,WHITE (REFRESH P.M. OPHT) Ophthalmic, Nightly    MULTIVIT &MINERALS/FERROUS FUM (MULTI VITAMIN ORAL) Oral    PROPYLENE GLYCOL//PF (SYSTANE, PF, OPHT) Ophthalmic, 4 times daily       ALLERGIES    Review of patient's allergies indicates:   Allergen Reactions    Darvocet a500 [propoxyphene n-acetaminophen] Other (See Comments)     hallucinations    Lortab [hydrocodone-acetaminophen] Hives    Tramadol          REVIEW OF SYSTEMS   Review of Systems       PHYSICAL EXAM    Reviewed Triage Note    VITAL SIGNS:   ED Triage Vitals [05/20/23 1634]   Enc Vitals Group      BP (!) 125/59      Pulse 88      Resp 20      Temp 97.7 °F (36.5 °C)      Temp Source Oral      SpO2 96 %      Weight       Height       Head Circumference       Peak Flow       Pain Score       Pain Loc       Pain Edu?       Excl. in GC?        Patient Vitals for the past 24 hrs:   BP Temp Temp src Pulse Resp SpO2   05/20/23 2014 (!) 185/79 -- -- 67 -- 99 %   05/20/23 1936 (!) 185/82 -- -- 83 18 98 %   05/20/23 1933 (!) 186/81 -- -- 79 -- 99 %   05/20/23 1931 (!) 185/80 -- -- 72 (!) 21 98 %   05/20/23 1634 (!) 125/59 97.7 °F (36.5 °C) Oral 88 20 96 %       Physical Exam    Nursing note and vitals reviewed.  Constitutional: She appears well-developed and well-nourished.   HENT:   Head: Normocephalic and atraumatic.   Eyes: EOM are normal. Pupils are equal, round, and reactive to light.   Neck:  Neck supple.   Normal range of motion.  Cardiovascular:  Normal rate and regular rhythm.           Pulmonary/Chest: Breath sounds normal. No respiratory distress.   Abdominal: Abdomen is soft. There is no abdominal tenderness.   Musculoskeletal:         General: Normal range of motion.      Cervical back: Normal range of motion and neck supple.      Comments: Tenderness to palpation throughout the thoracic and lumbar spine in the midline.  She also has reproducible chest wall tenderness over her sternum which is very prominent.  He has full range of motion of all extremities.     Neurological: She is alert and oriented to person, place, and time. No cranial nerve deficit or sensory deficit.   Skin: Skin is warm and dry.   Psychiatric: She has a normal mood and affect.          EKG     Interpreted by EM physician if performed:   Sinus rhythm at a rate of 81.  No ST elevation or depression.  No T-wave inversion.  Some baseline motion artifact noted.            LABS  Pertinent labs reviewed. (See chart for details)   Labs Reviewed   CBC W/ AUTO DIFFERENTIAL - Abnormal; Notable for the following components:       Result Value    RBC 3.77 (*)     Hemoglobin 11.3 (*)     Hematocrit 34.6 (*)     Immature Granulocytes 0.9 (*)     Immature Grans (Abs) 0.08 (*)     Lymph # 0.9 (*)     Gran % 84.2 (*)     Lymph % 10.1 (*)     All other components within normal limits   COMPREHENSIVE METABOLIC PANEL - Abnormal; Notable for the following components:    Potassium 3.0 (*)     Glucose 125 (*)     eGFR 56 (*)     All other components within normal limits   URINALYSIS, REFLEX TO URINE CULTURE - Abnormal; Notable for the following components:    Appearance, UA Hazy (*)     Protein, UA 2+ (*)     Glucose, UA Trace (*)     Occult Blood UA 1+ (*)     All other components within normal limits    Narrative:     Specimen Source->Urine   URINALYSIS MICROSCOPIC - Abnormal; Notable for the following components:    WBC, UA 8 (*)     Hyaline  Casts, UA 3 (*)     All other components within normal limits    Narrative:     Specimen Source->Urine   POCT GLUCOSE - Abnormal; Notable for the following components:    POCT Glucose 127 (*)     All other components within normal limits   TROPONIN I   TROPONIN I         RADIOLOGY          Imaging Results              CT Lumbar Spine Without Contrast (Final result)  Result time 05/20/23 21:43:26      Final result by Nathanael Arriola MD (05/20/23 21:43:26)                   Impression:      Multiple compression fractures are age indeterminate.  MRI may better characterize.  See above comments.      Electronically signed by: Nathanael Arriola  Date:    05/20/2023  Time:    21:43               Narrative:    EXAMINATION:  CT LUMBAR SPINE WITHOUT CONTRAST    CLINICAL HISTORY:  Back trauma, no prior imaging (Age >= 16y);    TECHNIQUE:  Low-dose axial, sagittal and coronal reformations are obtained through the lumbar spine.  Contrast was not administered.    FINDINGS:  Osseous demineralization and fatty replacement.    Mild compression fracture of L5.    Moderate compression fracture of L4.    Moderate compression fracture of L1    Moderate compression fracture of T12.    Severe compression fracture T11.    Mild retropulsion of T11 and L1.    Mild disc space narrowing at T10-11.    Mild grade 1 spondylolisthesis of L5 on S1.    L1-2: No significant disc bulge.    L2-3: No significant disc bulge    L3-4: Minimal posterior disc osteophyte complex.  Central canal neural foramen are adequately maintained.    L4-5: Mild diffuse disc bulge.  No significant central canal or foraminal narrowing.    L5-S1: Minimal disc bulge.  Central canal neural foramen are adequately maintained.  Moderate bilateral facet arthropathy.    The remaining visualized paravertebral structures demonstrate no pathology.                                       CT Thoracic Spine Without Contrast (Final result)  Result time 05/20/23 21:43:16      Final result by  Nathanael Arriola MD (05/20/23 21:43:16)                   Impression:      Compression fractures of T3, T4, T7, T11, T12 and L1 are age indeterminate.  See above comments.  MRI may better characterize      Electronically signed by: Nathanael Arriola  Date:    05/20/2023  Time:    21:43               Narrative:    EXAMINATION:  CT THORACIC SPINE WITHOUT CONTRAST    CLINICAL HISTORY:  Back trauma, no prior imaging (Age >= 16y);    TECHNIQUE:  Low-dose axial images through the thoracic spine without contrast.  Sagittal and coronal reconstructions.    COMPARISON:  None    FINDINGS:  There is prominent kyphosis of the thoracic spine.    Mild compression fracture of T3 with anterior wedging.  Probable minimal compression fracture T4 and T7.    Severe compression fracture of T11.  Moderate compression fracture of T12 and L1.  Mild retropulsion T11 and L1.    Central canal is adequately maintained throughout the thoracic spine.    Mild degenerative disc and endplate changes at T10-11.    Neural foramen appear adequately maintained.    No paraspinal mass or fluid collection.                                       X-Ray Foot Complete Right (Final result)  Result time 05/20/23 17:59:05      Final result by Pedro Mckeon DO (05/20/23 17:59:05)                   Impression:      No acute osseous abnormality.      Electronically signed by: Pedro Mckeon  Date:    05/20/2023  Time:    17:59               Narrative:    EXAMINATION:  XR FOOT COMPLETE 3 VIEW RIGHT    CLINICAL HISTORY:  . Unspecified fall, initial encounter    TECHNIQUE:  AP, lateral, and oblique views of the right foot were performed.    COMPARISON:  12/20/2009.    FINDINGS:  There is diffuse osteopenia.  There is no acute fracture or subluxation.  There is hallux valgus.  Alignment is otherwise unremarkable.  The Lisfranc articulation is congruent.  There is a small os peroneum.  There is scattered joint space narrowing.                                       X-Ray  Chest 1 View (Final result)  Result time 05/20/23 17:58:18      Final result by Pedro Mckeon DO (05/20/23 17:58:18)                   Impression:      No acute abnormality.      Electronically signed by: Pedro Mckeon  Date:    05/20/2023  Time:    17:58               Narrative:    EXAMINATION:  XR CHEST 1 VIEW    CLINICAL HISTORY:  Unspecified fall, initial encounter    TECHNIQUE:  Single frontal view of the chest was performed.    COMPARISON:  None    FINDINGS:  The lungs are well expanded and clear. No focal opacities are seen. The pleural spaces are clear.    The cardiac silhouette is enlarged.  There are calcifications of the aortic arch.    The visualized osseous structures demonstrate degenerative changes.                                       CT Cervical Spine Without Contrast (Final result)  Result time 05/20/23 17:39:04      Final result by Pedro Mckeon DO (05/20/23 17:39:04)                   Impression:      No acute fracture or subluxation of the cervical spine.    Multilevel degenerative changes as above.      Electronically signed by: Pedro Mckeon  Date:    05/20/2023  Time:    17:39               Narrative:    EXAMINATION:  CT CERVICAL SPINE WITHOUT CONTRAST    CLINICAL HISTORY:  Neck trauma (Age >= 65y);    TECHNIQUE:  Low dose axial images, sagittal and coronal reformations were performed though the cervical spine without intravenous contrast.    COMPARISON:  None available.    FINDINGS:  Alignment: There is mild anterolisthesis of C4 on C5.  Alignment otherwise normal.    Vertebra: There is diffuse osteopenia.  There is no acute fracture or subluxation of the cervical spine.  The vertebral body heights are maintained.    Discs: There is mild disc height loss at C3-C4 and C5-C6.  Remaining disc heights are maintained.    Degenerative changes: There are multilevel degenerative changes of the cervical spine.  At C3-C4 there is mild left neural foraminal narrowing secondary to facet  arthropathy and uncovertebral joint spurring.  At C5-C6 there is a posterior disc osteophyte complex resulting in mild spinal canal stenosis, as well as bilateral facet arthropathy and uncovertebral joint spurring resulting in mild bilateral neural foraminal narrowing.  Remaining levels demonstrate no high-grade spinal canal stenosis or neural foraminal narrowing.    Miscellaneous: The soft tissues of the neck are unremarkable.  There are vascular calcifications.  The visualized lung apices are clear.                                       CT Maxillofacial Without Contrast (Final result)  Result time 05/20/23 17:41:24      Final result by Pedro Mckeon DO (05/20/23 17:41:24)                   Impression:      No acute maxillofacial fracture.      Electronically signed by: Pedro Mckeon  Date:    05/20/2023  Time:    17:41               Narrative:    EXAMINATION:  CT MAXILLOFACIAL WITHOUT CONTRAST    CLINICAL HISTORY:  Facial trauma, blunt;    TECHNIQUE:  Low dose axial images, sagittal and coronal reformations were obtained through the face without intravenous contrast.    COMPARISON:  CT head from 01/26/2021.    FINDINGS:  There is no acute fracture or dislocation of the maxillofacial structures.  There is a remote fracture of the left nasal bone.  The nasal bones are otherwise intact.  The nasal septum approximates midline.    The mandible is intact and not dislocated. There degenerative changes of the bilateral temporomandibular joints.  There is scattered dental amalgam resulting in streak artifact.    The orbits are unremarkable. The bilateral globes are symmetric and intact. There is no preseptal or post-septal fluid or hemorrhage.  There are bilateral prosthetic lenses.    The paranasal sinuses and mastoid air cells are clear.    The soft tissues of the face are unremarkable.                                       CT Head Without Contrast (Final result)  Result time 05/20/23 17:31:06      Final result by  Pedro Mckeon DO (05/20/23 17:31:06)                   Impression:      No acute intracranial abnormality.    Chronic microvascular ischemic changes.    Ventriculomegaly, slightly out of proportion to the degree of sulcal enlargement. This could reflect central predominant cerebral volume loss, but configuration raises the possibility of normal pressure hydrocephalus.  Recommend clinical correlation.      Electronically signed by: Pedro Mckeon  Date:    05/20/2023  Time:    17:31               Narrative:    EXAMINATION:  CT HEAD WITHOUT CONTRAST    CLINICAL HISTORY:  Head trauma, minor (Age >= 65y);    TECHNIQUE:  Low dose axial CT images obtained throughout the head without intravenous contrast. Sagittal and coronal reconstructions were performed.    COMPARISON:  CT head 01/26/2021.    FINDINGS:  There is ventriculomegaly, slightly out of proportion to the degree of sulcal enlargement. This could reflect central predominant cerebral volume loss, but configuration raises the possibility of normal pressure hydrocephalus.  Recommend clinical correlation. No extra-axial blood or fluid collections.  There is hypoattenuation in the supratentorial white matter which can be seen with chronic microvascular ischemic changes, similar to prior.  No parenchymal mass, hemorrhage, edema or major vascular distribution infarct.  Continued irregular chronic calcifications of the falx and bilateral tentorial leaflets.    No calvarial fracture.  There is a remote fracture of left nasal bone.  The scalp is unremarkable.  Bilateral paranasal sinuses and mastoid air cells are clear.  There are bilateral prosthetic lenses.                                        PROCEDURES    Procedures      ED COURSE & MEDICAL DECISION MAKING    Pertinent Labs & Imaging studies reviewed. (See chart for details and specific orders.)        Summary of review of records:     MDM:  Lacey Mcelroy is a 82 y.o. female who presents evaluation after  syncopal episode occurring prior to arrival with subsequent pain noted diffusely throughout the back into her right foot.  Will evaluate for underlying electrolyte derangement, possible cardiac etiology, fractures versus sprain.  Lab work and imaging ordered.            Medications   potassium bicarbonate disintegrating tablet 40 mEq (has no administration in time range)   sodium chloride 0.9% bolus 1,000 mL 1,000 mL (0 mLs Intravenous Stopped 5/20/23 2155)   potassium chloride 10 mEq in 100 mL IVPB (0 mEq Intravenous Stopped 5/20/23 2210)   potassium chloride SA CR tablet 40 mEq (40 mEq Oral Given 5/20/23 2009)   magnesium sulfate 2g in water 50mL IVPB (premix) (0 g Intravenous Stopped 5/20/23 2210)   acetaminophen tablet 1,000 mg (1,000 mg Oral Given 5/20/23 2009)       ED Course as of 05/20/23 2309   Sat May 20, 2023   1907 Potassium(!): 3.0  Hypokalemia noted, will give IV and oral replacement as well as mg for improved absorption [HL]   1907 Troponin I: <0.006 [HL]   1907 POCT Glucose(!): 127 [HL]   1907 Urinalysis, Reflex to Urine Culture Urine, Clean Catch(!)  No e/o infection. [HL]   1907 CBC auto differential(!)  No acute abnormalities  [HL]   1908 X-Ray Foot Complete Right  No acute osseous abnormality. [HL]   1908 X-Ray Chest 1 View     No acute abnormality. [HL]   1908 CT Maxillofacial Without Contrast  No acute maxillofacial fracture.    [HL]   1908 CT Cervical Spine Without Contrast  No acute fracture or subluxation of the cervical spine.     Multilevel degenerative changes as above.    [HL]   1908 CT Head Without Contrast  No acute intracranial abnormality.     Chronic microvascular ischemic changes.     Ventriculomegaly, slightly out of proportion to the degree of sulcal enlargement. This could reflect central predominant cerebral volume loss, but configuration raises the possibility of normal pressure hydrocephalus.  Recommend clinical correlation. [HL]   2155 CT Lumbar Spine Without  Contrast  Multiple compression fractures are age indeterminate [HL]   2306 Troponin I: <0.006 [HL]   2308 CT Thoracic Spine Without Contrast  Compression fractures of T3, T4, T7, T11, T12 and L1 are age indeterminate [HL]      ED Course User Index  [HL] Rosa Villalta,        Delta troponin and EKG reassuring. Feel this is unlikely cardiogenic syncope. More likely 2/2 volume depletion due to recent diarrhea. Discussed all findings plan of care with patient and daughter at bedside.  They do feel comfortable managing the compression fractures at home and with close follow up with their primary care physician.  Encouraged close follow up in regards to hypokalemia as well.  Likely in the setting of recent diarrhea. Given , neurosurgry, for f/u as needed. All questions addressed and reasons to return discussed.       FINAL IMPRESSION    Final diagnoses:  [R55] Syncope  [W19.XXXA] Fall  [R55] Syncope and collapse  [S32.000S] Compression fracture of lumbar vertebra, unspecified lumbar vertebral level, sequela (Primary)  [S22.000A] Compression fracture of thoracic vertebra, unspecified thoracic vertebral level, initial encounter       DISPOSITION  Patient discharged in stable condition        ED Prescriptions    None       Follow-up Information       Follow up With Specialties Details Why Contact Info    Paulo Thomas MD Family Medicine Schedule an appointment as soon as possible for a visit   [address] [facility]  [address]  [facility] [id number] [phone]      [facility] - Emergency Dept Emergency Medicine  As needed, If symptoms worsen [address] [facility] [address] [phone]              DISCLAIMER: This note was prepared with M*modal voice recognition transcription software. Garbled syntax, mangled pronouns, and other bizarre constructions may be attributed to that software system.      DO Rosa Patel DO  05/23/23 2477

## 2023-05-21 NOTE — DISCHARGE INSTRUCTIONS
Compression fractures of T3, T4, T7, T11, T12 and L1 are age indeterminate    Mild compression fracture of L5.     Moderate compression fracture of L4.     Moderate compression fracture of L1     Moderate compression fracture of T12.     Severe compression fracture T11.

## 2023-05-21 NOTE — ED TRIAGE NOTES
The patient was transported to the ED by daughter for evaluation of 2 syncopal episodes. The patient landing in a prone position. +head injury and LOC. The patient was cleared by cervical collar by Dr. Villalta upon her evaluation. The patient complains of upper and lower back pain. Hx of multiple compression fractures from a previous fall.    Adult Physical Assessment  LOC: Lacey Mcelroy, 82 y.o. female verified via two identifiers.  The patient is awake, alert, oriented and speaking appropriately at this time.  APPEARANCE: Patient resting comfortably and appears to be in no acute distress at this time. Patient is clean and well groomed, patient's clothing is properly fastened.  SKIN:The skin is warm and dry, color consistent with ethnicity, patient has normal skin turgor and moist mucus membranes, skin intact, no breakdown or brusing noted.  MUSCULOSKELETAL: Patient moving all extremities well, no obvious swelling or deformities noted. C/o upper and lower back pain. Midline tenderness to thoracic and lumbar spines.  RESPIRATORY: Airway is open and patent, respirations are spontaneous, patient has a normal effort and rate, no accessory muscle use noted.  CARDIAC: Patient has a normal rate and rhythm, no periphreal edema noted in any extremity, capillary refill < 3 seconds in all extremities  ABDOMEN: Soft and non tender to palpation, no abdominal distention noted. Bowel sounds present in all four quadrants.  NEUROLOGIC: Eyes open spontaneously, behavior appropriate to situation, follows commands, facial expression symmetrical, bilateral hand grasp equal and even, purposeful motor response noted, normal sensation in all extremities when touched with a finger.

## 2023-05-31 ENCOUNTER — HOSPITAL ENCOUNTER (EMERGENCY)
Facility: HOSPITAL | Age: 83
Discharge: HOME | End: 2023-05-31
Attending: EMERGENCY MEDICINE
Payer: MEDICARE

## 2023-05-31 VITALS
DIASTOLIC BLOOD PRESSURE: 61 MMHG | RESPIRATION RATE: 18 BRPM | OXYGEN SATURATION: 92 % | TEMPERATURE: 98 F | SYSTOLIC BLOOD PRESSURE: 140 MMHG | HEART RATE: 112 BPM

## 2023-05-31 DIAGNOSIS — T14.8XXA MUSCLE STRAIN: ICD-10-CM

## 2023-05-31 DIAGNOSIS — T07.XXXA MULTIPLE CONTUSIONS: ICD-10-CM
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
## 2023-05-31 LAB
ALBUMIN SERPL BCP-MCNC: 3.5 G/DL (ref 3.5–5.2)
ALP SERPL-CCNC: 88 U/L (ref 55–135)
ALT SERPL W/O P-5'-P-CCNC: 16 U/L (ref 10–44)
ANION GAP SERPL CALC-SCNC: 12 MMOL/L (ref 8–16)
AST SERPL-CCNC: 16 U/L (ref 10–40)
BASOPHILS # BLD AUTO: 0.02 K/UL (ref 0–0.2)
BASOPHILS NFR BLD: 0.2 % (ref 0–1.9)
BILIRUB SERPL-MCNC: 0.5 MG/DL (ref 0.1–1)
BUN SERPL-MCNC: 26 MG/DL (ref 8–23)
CALCIUM SERPL-MCNC: 9.3 MG/DL (ref 8.7–10.5)
CHLORIDE SERPL-SCNC: 104 MMOL/L (ref 95–110)
CK SERPL-CCNC: 80 U/L (ref 20–180)
CO2 SERPL-SCNC: 22 MMOL/L (ref 23–29)
CREAT SERPL-MCNC: 0.9 MG/DL (ref 0.5–1.4)
DIFFERENTIAL METHOD: ABNORMAL
EOSINOPHIL # BLD AUTO: 0 K/UL (ref 0–0.5)
EOSINOPHIL NFR BLD: 0 % (ref 0–8)
ERYTHROCYTE [DISTWIDTH] IN BLOOD BY AUTOMATED COUNT: 12.7 % (ref 11.5–14.5)
EST. GFR  (NO RACE VARIABLE): >60 ML/MIN/1.73 M^2
GLUCOSE SERPL-MCNC: 129 MG/DL (ref 70–110)
HCT VFR BLD AUTO: 32.4 % (ref 37–48.5)
HGB BLD-MCNC: 10.8 G/DL (ref 12–16)
IMM GRANULOCYTES # BLD AUTO: 0.04 K/UL (ref 0–0.04)
IMM GRANULOCYTES NFR BLD AUTO: 0.4 % (ref 0–0.5)
LYMPHOCYTES # BLD AUTO: 0.3 K/UL (ref 1–4.8)
LYMPHOCYTES NFR BLD: 3.2 % (ref 18–48)
MCH RBC QN AUTO: 29.8 PG (ref 27–31)
MCHC RBC AUTO-ENTMCNC: 33.3 G/DL (ref 32–36)
MCV RBC AUTO: 89 FL (ref 82–98)
MONOCYTES # BLD AUTO: 0.5 K/UL (ref 0.3–1)
MONOCYTES NFR BLD: 5.1 % (ref 4–15)
NEUTROPHILS # BLD AUTO: 9.4 K/UL (ref 1.8–7.7)
NEUTROPHILS NFR BLD: 91.1 % (ref 38–73)
NRBC BLD-RTO: 0 /100 WBC
PLATELET # BLD AUTO: 155 K/UL (ref 150–450)
PMV BLD AUTO: 9.2 FL (ref 9.2–12.9)
POTASSIUM SERPL-SCNC: 3 MMOL/L (ref 3.5–5.1)
PROT SERPL-MCNC: 6.6 G/DL (ref 6–8.4)
RBC # BLD AUTO: 3.63 M/UL (ref 4–5.4)
SODIUM SERPL-SCNC: 138 MMOL/L (ref 136–145)
WBC # BLD AUTO: 10.29 K/UL (ref 3.9–12.7)

## 2023-05-31 PROCEDURE — 85025 COMPLETE CBC W/AUTO DIFF WBC: CPT | Performed by: EMERGENCY MEDICINE

## 2023-05-31 PROCEDURE — 80053 COMPREHEN METABOLIC PANEL: CPT | Performed by: EMERGENCY MEDICINE

## 2023-05-31 PROCEDURE — 82550 ASSAY OF CK (CPK): CPT | Performed by: EMERGENCY MEDICINE

## 2023-05-31 PROCEDURE — 25000003 PHARM REV CODE 250: Performed by: EMERGENCY MEDICINE

## 2023-05-31 PROCEDURE — 99284 EMERGENCY DEPT VISIT MOD MDM: CPT | Mod: 25

## 2023-05-31 RX ORDER — IBUPROFEN 600 MG/1
600 TABLET ORAL
Status: DISCONTINUED | OUTPATIENT
Start: 2023-05-31 | End: 2023-06-01 | Stop reason: HOSPADM

## 2023-05-31 RX ORDER — OXYCODONE AND ACETAMINOPHEN 5; 325 MG/1; MG/1
1 TABLET ORAL EVERY 6 HOURS PRN
Qty: 12 TABLET | Refills: 0 | Status: ON HOLD | OUTPATIENT
Start: 2023-05-31 | End: 2023-06-01 | Stop reason: HOSPADM

## 2023-05-31 RX ORDER — OXYCODONE AND ACETAMINOPHEN 5; 325 MG/1; MG/1
1 TABLET ORAL ONCE
Status: COMPLETED | OUTPATIENT
Start: 2023-05-31 | End: 2023-05-31

## 2023-05-31 RX ADMIN — OXYCODONE HYDROCHLORIDE AND ACETAMINOPHEN 1 TABLET: 5; 325 TABLET ORAL at 10:05

## 2023-06-01 ENCOUNTER — HOSPITAL ENCOUNTER (OUTPATIENT)
Facility: HOSPITAL | Age: 83
Discharge: NURSING, REHAB OR LONG-TERM CARE | End: 2023-06-02
Attending: EMERGENCY MEDICINE | Admitting: INTERNAL MEDICINE
Payer: MEDICARE

## 2023-06-01 DIAGNOSIS — R07.9 CHEST PAIN, UNSPECIFIED TYPE: ICD-10-CM

## 2023-06-01 DIAGNOSIS — E83.42 HYPOMAGNESEMIA: ICD-10-CM

## 2023-06-01 DIAGNOSIS — R09.02 HYPOXIA: ICD-10-CM
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
## 2023-06-01 DIAGNOSIS — N18.32 STAGE 3B CHRONIC KIDNEY DISEASE: Chronic | ICD-10-CM

## 2023-06-01 DIAGNOSIS — E87.6 HYPOKALEMIA: ICD-10-CM

## 2023-06-01 DIAGNOSIS — R53.81 PHYSICAL DECONDITIONING: ICD-10-CM

## 2023-06-01 DIAGNOSIS — Z91.89 AT RISK FOR INADEQUATE PAIN CONTROL: ICD-10-CM

## 2023-06-01 DIAGNOSIS — N18.32 ANEMIA IN STAGE 3B CHRONIC KIDNEY DISEASE: Chronic | ICD-10-CM

## 2023-06-01 DIAGNOSIS — S32.000D COMPRESSION FRACTURE OF LUMBAR VERTEBRA WITH ROUTINE HEALING, UNSPECIFIED LUMBAR VERTEBRAL LEVEL, SUBSEQUENT ENCOUNTER: ICD-10-CM

## 2023-06-01 DIAGNOSIS — D63.1 ANEMIA IN STAGE 3B CHRONIC KIDNEY DISEASE: Chronic | ICD-10-CM

## 2023-06-01 DIAGNOSIS — S32.000A COMPRESSION FRACTURE OF LUMBAR VERTEBRA, UNSPECIFIED LUMBAR VERTEBRAL LEVEL, INITIAL ENCOUNTER: ICD-10-CM

## 2023-06-01 LAB
ANION GAP SERPL CALC-SCNC: 12 MMOL/L (ref 8–16)
AV INDEX (PROSTH): 0.57
AV MEAN GRADIENT: 5 MMHG
AV PEAK GRADIENT: 10 MMHG
AV VALVE AREA: 1.8 CM2
AV VELOCITY RATIO: 0.52
BASOPHILS # BLD AUTO: 0.01 K/UL (ref 0–0.2)
BASOPHILS NFR BLD: 0.1 % (ref 0–1.9)
BSA FOR ECHO PROCEDURE: 1.39 M2
BUN SERPL-MCNC: 30 MG/DL (ref 8–23)
CALCIUM SERPL-MCNC: 8.9 MG/DL (ref 8.7–10.5)
CHLORIDE SERPL-SCNC: 103 MMOL/L (ref 95–110)
CO2 SERPL-SCNC: 21 MMOL/L (ref 23–29)
CREAT SERPL-MCNC: 1.1 MG/DL (ref 0.5–1.4)
CV ECHO LV RWT: 0.63 CM
DIFFERENTIAL METHOD: ABNORMAL
DOP CALC AO PEAK VEL: 1.62 M/S
DOP CALC AO VTI: 31.8 CM
DOP CALC LVOT AREA: 3.2 CM2
DOP CALC LVOT DIAMETER: 2.01 CM
DOP CALC LVOT PEAK VEL: 0.85 M/S
DOP CALC LVOT STROKE VOLUME: 57.09 CM3
DOP CALC MV VTI: 24.8 CM
DOP CALCLVOT PEAK VEL VTI: 18 CM
E WAVE DECELERATION TIME: 265.4 MSEC
E/A RATIO: 0.92
E/E' RATIO: 12.46 M/S
ECHO LV POSTERIOR WALL: 0.98 CM (ref 0.6–1.1)
EJECTION FRACTION: 75 %
EOSINOPHIL # BLD AUTO: 0 K/UL (ref 0–0.5)
EOSINOPHIL NFR BLD: 0 % (ref 0–8)
ERYTHROCYTE [DISTWIDTH] IN BLOOD BY AUTOMATED COUNT: 12.7 % (ref 11.5–14.5)
EST. GFR  (NO RACE VARIABLE): 50 ML/MIN/1.73 M^2
FERRITIN SERPL-MCNC: 397 NG/ML (ref 20–300)
FOLATE SERPL-MCNC: 6.1 NG/ML (ref 4–24)
FRACTIONAL SHORTENING: 39 % (ref 28–44)
GLUCOSE SERPL-MCNC: 121 MG/DL (ref 70–110)
HCT VFR BLD AUTO: 30.6 % (ref 37–48.5)
HGB BLD-MCNC: 10.1 G/DL (ref 12–16)
IMM GRANULOCYTES # BLD AUTO: 0.05 K/UL (ref 0–0.04)
IMM GRANULOCYTES NFR BLD AUTO: 0.6 % (ref 0–0.5)
INTERVENTRICULAR SEPTUM: 0.88 CM (ref 0.6–1.1)
IRON SERPL-MCNC: 13 UG/DL (ref 30–160)
LA MAJOR: 4.53 CM
LA WIDTH: 3.9 CM
LEFT ATRIUM SIZE: 2.3 CM
LEFT ATRIUM VOLUME INDEX MOD: 26.8 ML/M2
LEFT ATRIUM VOLUME MOD: 37.31 CM3
LEFT INTERNAL DIMENSION IN SYSTOLE: 1.89 CM (ref 2.1–4)
LEFT VENTRICLE DIASTOLIC VOLUME INDEX: 27.8 ML/M2
LEFT VENTRICLE DIASTOLIC VOLUME: 38.64 ML
LEFT VENTRICLE MASS INDEX: 56 G/M2
LEFT VENTRICLE SYSTOLIC VOLUME INDEX: 7.9 ML/M2
LEFT VENTRICLE SYSTOLIC VOLUME: 11.02 ML
LEFT VENTRICULAR INTERNAL DIMENSION IN DIASTOLE: 3.12 CM (ref 3.5–6)
LEFT VENTRICULAR MASS: 78.09 G
LV LATERAL E/E' RATIO: 10.13 M/S
LV SEPTAL E/E' RATIO: 16.2 M/S
LVOT MG: 1.32 MMHG
LVOT MV: 0.54 CM/S
LYMPHOCYTES # BLD AUTO: 0.6 K/UL (ref 1–4.8)
LYMPHOCYTES NFR BLD: 6.5 % (ref 18–48)
MAGNESIUM SERPL-MCNC: 1.3 MG/DL (ref 1.6–2.6)
MCH RBC QN AUTO: 30.1 PG (ref 27–31)
MCHC RBC AUTO-ENTMCNC: 33 G/DL (ref 32–36)
MCV RBC AUTO: 91 FL (ref 82–98)
MONOCYTES # BLD AUTO: 0.4 K/UL (ref 0.3–1)
MONOCYTES NFR BLD: 4 % (ref 4–15)
MV MEAN GRADIENT: 2 MMHG
MV PEAK A VEL: 0.88 M/S
MV PEAK E VEL: 0.81 M/S
MV PEAK GRADIENT: 4 MMHG
MV STENOSIS PRESSURE HALF TIME: 76.97 MS
MV VALVE AREA BY CONTINUITY EQUATION: 2.3 CM2
MV VALVE AREA P 1/2 METHOD: 2.86 CM2
NEUTROPHILS # BLD AUTO: 8 K/UL (ref 1.8–7.7)
NEUTROPHILS NFR BLD: 88.8 % (ref 38–73)
NRBC BLD-RTO: 0 /100 WBC
OHS LV EJECTION FRACTION SIMPSONS BIPLANE MOD: 8 %
PHOSPHATE SERPL-MCNC: 3.4 MG/DL (ref 2.7–4.5)
PISA TR MAX VEL: 4 M/S
PLATELET # BLD AUTO: 134 K/UL (ref 150–450)
PMV BLD AUTO: 9.5 FL (ref 9.2–12.9)
POTASSIUM SERPL-SCNC: 4.2 MMOL/L (ref 3.5–5.1)
RA MAJOR: 4.02 CM
RBC # BLD AUTO: 3.35 M/UL (ref 4–5.4)
RIGHT VENTRICULAR END-DIASTOLIC DIMENSION: 3.12 CM
RV TISSUE DOPPLER FREE WALL SYSTOLIC VELOCITY 1 (APICAL 4 CHAMBER VIEW): 0.01 CM/S
SATURATED IRON: 4 % (ref 20–50)
SODIUM SERPL-SCNC: 136 MMOL/L (ref 136–145)
TDI LATERAL: 0.08 M/S
TDI SEPTAL: 0.05 M/S
TDI: 0.07 M/S
TOTAL IRON BINDING CAPACITY: 339 UG/DL (ref 250–450)
TR MAX PG: 64 MMHG
TRANSFERRIN SERPL-MCNC: 229 MG/DL (ref 200–375)
VIT B12 SERPL-MCNC: 244 PG/ML (ref 210–950)
WBC # BLD AUTO: 9.04 K/UL (ref 3.9–12.7)

## 2023-06-01 PROCEDURE — 96376 TX/PRO/DX INJ SAME DRUG ADON: CPT

## 2023-06-01 PROCEDURE — 97162 PT EVAL MOD COMPLEX 30 MIN: CPT

## 2023-06-01 PROCEDURE — 99285 EMERGENCY DEPT VISIT HI MDM: CPT | Mod: 25

## 2023-06-01 PROCEDURE — 97530 THERAPEUTIC ACTIVITIES: CPT

## 2023-06-01 PROCEDURE — 97166 OT EVAL MOD COMPLEX 45 MIN: CPT

## 2023-06-01 PROCEDURE — G0378 HOSPITAL OBSERVATION PER HR: HCPCS

## 2023-06-01 PROCEDURE — 93010 EKG 12-LEAD: ICD-10-PCS | Mod: ,,, | Performed by: INTERNAL MEDICINE

## 2023-06-01 PROCEDURE — 82728 ASSAY OF FERRITIN: CPT | Performed by: STUDENT IN AN ORGANIZED HEALTH CARE EDUCATION/TRAINING PROGRAM

## 2023-06-01 PROCEDURE — 82746 ASSAY OF FOLIC ACID SERUM: CPT | Performed by: STUDENT IN AN ORGANIZED HEALTH CARE EDUCATION/TRAINING PROGRAM

## 2023-06-01 PROCEDURE — 83735 ASSAY OF MAGNESIUM: CPT | Performed by: STUDENT IN AN ORGANIZED HEALTH CARE EDUCATION/TRAINING PROGRAM

## 2023-06-01 PROCEDURE — 93010 ELECTROCARDIOGRAM REPORT: CPT | Mod: ,,, | Performed by: INTERNAL MEDICINE

## 2023-06-01 PROCEDURE — 97116 GAIT TRAINING THERAPY: CPT

## 2023-06-01 PROCEDURE — 96361 HYDRATE IV INFUSION ADD-ON: CPT

## 2023-06-01 PROCEDURE — 63600175 PHARM REV CODE 636 W HCPCS: Performed by: STUDENT IN AN ORGANIZED HEALTH CARE EDUCATION/TRAINING PROGRAM

## 2023-06-01 PROCEDURE — 96372 THER/PROPH/DIAG INJ SC/IM: CPT | Mod: 59 | Performed by: INTERNAL MEDICINE

## 2023-06-01 PROCEDURE — 25000003 PHARM REV CODE 250: Performed by: STUDENT IN AN ORGANIZED HEALTH CARE EDUCATION/TRAINING PROGRAM

## 2023-06-01 PROCEDURE — 96365 THER/PROPH/DIAG IV INF INIT: CPT

## 2023-06-01 PROCEDURE — 85025 COMPLETE CBC W/AUTO DIFF WBC: CPT | Performed by: STUDENT IN AN ORGANIZED HEALTH CARE EDUCATION/TRAINING PROGRAM

## 2023-06-01 PROCEDURE — 25000003 PHARM REV CODE 250: Performed by: INTERNAL MEDICINE

## 2023-06-01 PROCEDURE — 93005 ELECTROCARDIOGRAM TRACING: CPT

## 2023-06-01 PROCEDURE — 51798 US URINE CAPACITY MEASURE: CPT

## 2023-06-01 PROCEDURE — 97535 SELF CARE MNGMENT TRAINING: CPT

## 2023-06-01 PROCEDURE — 84466 ASSAY OF TRANSFERRIN: CPT | Performed by: STUDENT IN AN ORGANIZED HEALTH CARE EDUCATION/TRAINING PROGRAM

## 2023-06-01 PROCEDURE — 80048 BASIC METABOLIC PNL TOTAL CA: CPT | Performed by: STUDENT IN AN ORGANIZED HEALTH CARE EDUCATION/TRAINING PROGRAM

## 2023-06-01 PROCEDURE — 96375 TX/PRO/DX INJ NEW DRUG ADDON: CPT

## 2023-06-01 PROCEDURE — 63600175 PHARM REV CODE 636 W HCPCS: Performed by: INTERNAL MEDICINE

## 2023-06-01 PROCEDURE — 96374 THER/PROPH/DIAG INJ IV PUSH: CPT

## 2023-06-01 PROCEDURE — 82607 VITAMIN B-12: CPT | Performed by: STUDENT IN AN ORGANIZED HEALTH CARE EDUCATION/TRAINING PROGRAM

## 2023-06-01 PROCEDURE — 84100 ASSAY OF PHOSPHORUS: CPT | Performed by: STUDENT IN AN ORGANIZED HEALTH CARE EDUCATION/TRAINING PROGRAM

## 2023-06-01 RX ORDER — OXYCODONE HYDROCHLORIDE 5 MG/1
5 TABLET ORAL EVERY 8 HOURS PRN
Status: DISCONTINUED | OUTPATIENT
Start: 2023-06-01 | End: 2023-06-02 | Stop reason: HOSPADM

## 2023-06-01 RX ORDER — DEXTROSE 40 %
30 GEL (GRAM) ORAL
Status: DISCONTINUED | OUTPATIENT
Start: 2023-06-01 | End: 2023-06-02 | Stop reason: HOSPADM

## 2023-06-01 RX ORDER — POTASSIUM CHLORIDE 7.45 MG/ML
10 INJECTION INTRAVENOUS
Status: DISCONTINUED | OUTPATIENT
Start: 2023-06-01 | End: 2023-06-01

## 2023-06-01 RX ORDER — KETOROLAC TROMETHAMINE 30 MG/ML
15 INJECTION, SOLUTION INTRAMUSCULAR; INTRAVENOUS ONCE AS NEEDED
Status: COMPLETED | OUTPATIENT
Start: 2023-06-01 | End: 2023-06-01

## 2023-06-01 RX ORDER — ENOXAPARIN SODIUM 100 MG/ML
30 INJECTION SUBCUTANEOUS EVERY 24 HOURS
Status: DISCONTINUED | OUTPATIENT
Start: 2023-06-01 | End: 2023-06-02 | Stop reason: HOSPADM

## 2023-06-01 RX ORDER — LIDOCAINE 50 MG/G
1 PATCH TOPICAL
Status: DISCONTINUED | OUTPATIENT
Start: 2023-06-01 | End: 2023-06-01

## 2023-06-01 RX ORDER — GLUCAGON 1 MG
1 KIT INJECTION
Status: DISCONTINUED | OUTPATIENT
Start: 2023-06-01 | End: 2023-06-02 | Stop reason: HOSPADM

## 2023-06-01 RX ORDER — POTASSIUM CHLORIDE 7.45 MG/ML
10 INJECTION INTRAVENOUS
Status: COMPLETED | OUTPATIENT
Start: 2023-06-01 | End: 2023-06-01

## 2023-06-01 RX ORDER — SODIUM CHLORIDE 0.9 % (FLUSH) 0.9 %
10 SYRINGE (ML) INJECTION EVERY 12 HOURS PRN
Status: DISCONTINUED | OUTPATIENT
Start: 2023-06-01 | End: 2023-06-02 | Stop reason: HOSPADM

## 2023-06-01 RX ORDER — LIDOCAINE 50 MG/G
3 PATCH TOPICAL DAILY
Refills: 0 | Status: ON HOLD
Start: 2023-06-01 | End: 2023-06-05 | Stop reason: HOSPADM

## 2023-06-01 RX ORDER — DEXTROSE 40 %
15 GEL (GRAM) ORAL
Status: DISCONTINUED | OUTPATIENT
Start: 2023-06-01 | End: 2023-06-02 | Stop reason: HOSPADM

## 2023-06-01 RX ORDER — POTASSIUM CHLORIDE 20 MEQ/1
40 TABLET, EXTENDED RELEASE ORAL EVERY 4 HOURS
Status: DISCONTINUED | OUTPATIENT
Start: 2023-06-01 | End: 2023-06-01

## 2023-06-01 RX ORDER — LIDOCAINE 50 MG/G
3 PATCH TOPICAL
Status: DISCONTINUED | OUTPATIENT
Start: 2023-06-01 | End: 2023-06-02 | Stop reason: HOSPADM

## 2023-06-01 RX ORDER — ACETAMINOPHEN 325 MG/1
650 TABLET ORAL EVERY 6 HOURS PRN
Status: DISCONTINUED | OUTPATIENT
Start: 2023-06-01 | End: 2023-06-02 | Stop reason: HOSPADM

## 2023-06-01 RX ORDER — NALOXONE HCL 0.4 MG/ML
0.02 VIAL (ML) INJECTION
Status: DISCONTINUED | OUTPATIENT
Start: 2023-06-01 | End: 2023-06-02 | Stop reason: HOSPADM

## 2023-06-01 RX ORDER — MAGNESIUM SULFATE HEPTAHYDRATE 40 MG/ML
2 INJECTION, SOLUTION INTRAVENOUS
Status: COMPLETED | OUTPATIENT
Start: 2023-06-01 | End: 2023-06-01

## 2023-06-01 RX ORDER — ENOXAPARIN SODIUM 100 MG/ML
40 INJECTION SUBCUTANEOUS EVERY 24 HOURS
Status: DISCONTINUED | OUTPATIENT
Start: 2023-06-01 | End: 2023-06-01

## 2023-06-01 RX ADMIN — LIDOCAINE 1 PATCH: 50 PATCH CUTANEOUS at 03:06

## 2023-06-01 RX ADMIN — POTASSIUM CHLORIDE 10 MEQ: 7.46 INJECTION, SOLUTION INTRAVENOUS at 06:06

## 2023-06-01 RX ADMIN — POTASSIUM CHLORIDE 10 MEQ: 7.46 INJECTION, SOLUTION INTRAVENOUS at 03:06

## 2023-06-01 RX ADMIN — OXYCODONE HYDROCHLORIDE 5 MG: 5 TABLET ORAL at 04:06

## 2023-06-01 RX ADMIN — MAGNESIUM SULFATE 2 G: 2 INJECTION INTRAVENOUS at 09:06

## 2023-06-01 RX ADMIN — POTASSIUM CHLORIDE 10 MEQ: 7.46 INJECTION, SOLUTION INTRAVENOUS at 05:06

## 2023-06-01 RX ADMIN — ACETAMINOPHEN 650 MG: 325 TABLET ORAL at 09:06

## 2023-06-01 RX ADMIN — OXYCODONE HYDROCHLORIDE 2.5 MG: 5 TABLET ORAL at 06:06

## 2023-06-01 RX ADMIN — SODIUM CHLORIDE, POTASSIUM CHLORIDE, SODIUM LACTATE AND CALCIUM CHLORIDE 1000 ML: 600; 310; 30; 20 INJECTION, SOLUTION INTRAVENOUS at 12:06

## 2023-06-01 RX ADMIN — POTASSIUM CHLORIDE 10 MEQ: 7.46 INJECTION, SOLUTION INTRAVENOUS at 09:06

## 2023-06-01 RX ADMIN — MAGNESIUM SULFATE 2 G: 2 INJECTION INTRAVENOUS at 08:06

## 2023-06-01 RX ADMIN — KETOROLAC TROMETHAMINE 15 MG: 30 INJECTION, SOLUTION INTRAMUSCULAR; INTRAVENOUS at 03:06

## 2023-06-01 RX ADMIN — ENOXAPARIN SODIUM 30 MG: 30 INJECTION SUBCUTANEOUS at 04:06

## 2023-06-01 RX ADMIN — LIDOCAINE 3 PATCH: 50 PATCH TOPICAL at 12:06

## 2023-06-01 NOTE — DISCHARGE INSTRUCTIONS
You had multiple Xrays that did not show any new broken bones. You will be sore for a few days because of your fall and from lying on the ground. You can take acetaminophen and ibuprofen for pain. Stay hydrated and be careful because you are at risk for more falls because you are in pain. Paulo Thomas MD  [address] [facility] [address]  [facility] [address] [phone]    Schedule an appointment as soon as possible for a visit in 3 days

## 2023-06-01 NOTE — ED PROVIDER NOTES
Emergency Department Encounter  Provider Note  Encounter Date: 6/1/2023    Patient Name: Lacey Mcelroy  MRN: [id number]    History of Present Illness   HPI  History of Present Illness:    Chief Complaint:   Chief Complaint   Patient presents with    Back Pain     Pt c/o back pain. Pt BIB daughter d/t concerns after being discharged earlier from this ED. Pt told her daughter that she wanted to come back to ED d/t pain and wanted to stay at this hospital. Family was unable to get pt from vehicle to the wheelchair at home. Prior to fall yesterday, pt was ambulating without difficulty using her walking cane.        82f re-presenting to the ED for continued pain after discharge a few hours ago. Unable to get out of the car and into her house. Back to be admitted for pain control. No new falls. Sleepy after oxycodone.     The following PMH/PSH/SocHx/FamHx has been reviewed by myself:    Past Medical History:   Diagnosis Date    Frozen shoulder     History of atrial fibrillation     History of torn meniscus of left knee     Hypertension     Osteoporosis      Past Surgical History:   Procedure Laterality Date    ANKLE FRACTURE SURGERY  2009    right    CHOLECYSTECTOMY  2008     Social History     Tobacco Use    Smoking status: Never    Smokeless tobacco: Never   Substance Use Topics    Alcohol use: No     Family History   Problem Relation Age of Onset    Heart disease Father     Hypertension Father     Rheumatic fever Sister     Hypertension Sister     Heart disease Brother     Peripheral vascular disease Brother     Hypertension Brother     Diabetes type II Sister     Hypertension Sister     Heart disease Sister        Allergies reviewed:  Review of patient's allergies indicates:   Allergen Reactions    Darvocet a500 [propoxyphene n-acetaminophen] Other (See Comments)     hallucinations    Lortab [hydrocodone-acetaminophen] Hives    Tramadol        Medications reviewed:  Medication List with Changes/Refills   Current  Medications    ACETAMINOPHEN (TYLENOL) 500 MG TABLET    Take 500 mg by mouth every 6 (six) hours as needed for Pain.    CHOLECALCIFEROL, VITAMIN D3, (VITAMIN D3) 50 MCG (2,000 UNIT) CAP    Take 1 capsule by mouth once daily.    FERROUS SULFATE (FEOSOL) TAB TABLET    Take 1 tablet (1 each total) by mouth daily with breakfast.    IBUPROFEN (ADVIL,MOTRIN) 200 MG TABLET    Take 200 mg by mouth every 6 (six) hours as needed for Pain.    MINERAL OIL/PETROLATUM,WHITE (REFRESH P.M. OPHT)    Apply to eye nightly.    MULTIVIT &MINERALS/FERROUS FUM (MULTI VITAMIN ORAL)    Take by mouth.    OXYCODONE-ACETAMINOPHEN (PERCOCET) 5-325 MG PER TABLET    Take 1 tablet by mouth every 6 (six) hours as needed for Pain.    PROPYLENE GLYCOL//PF (SYSTANE, PF, OPHT)    Apply to eye 4 (four) times daily.       ROS  Review of Systems:    Constitutional:  Negative for fever.   HENT:  Negative for sore throat.    Eyes:  Negative for redness.   Respiratory:  Negative for shortness of breath.    Cardiovascular:  Negative for chest pain.   Gastrointestinal:  Negative for nausea.   Genitourinary:  Negative for dysuria.   Musculoskeletal:  Negative for back pain.   Skin:  Negative for rash.   Neurological:  Negative for weakness.   Hematological:  Does not bruise/bleed easily.   Psychiatric/Behavioral:  The patient is not nervous/anxious.      Physical Exam   Physical Exam    Initial Vitals [06/01/23 0118]   BP Pulse Resp Temp SpO2   120/63 93 18 98 °F (36.7 °C) (!) 93 %      MAP       --           Triage vital signs reviewed.    Constitutional: Well-nourished, well-developed. Not in acute distress. Sleepy but wakes to voice.   HENT: Normocephalic, atraumatic. Moist mucous membranes.  Eyes: No conjunctival injection.  Resp: Normal respiratory effort, breathing unlabored.  Cardio: Regular rate and rhythm.  GI: Abdomen non-distended.   MSK: Extremities without any deformities noted. No lower extremity edema.  Skin: Warm and dry. No rashes or  lesions noted.  Neuro: Awake and alert. Moves all extremities.    ED Course   Procedures    Medical Decision Making    History Acquisition     Assessment requiring an independent historian and why historian was needed: daughter giving hx, pt is sleepy    Review of prior external/non ED notes:     Differential Diagnoses   Based on available information and initial assessment, the working Differential Diagnosis includes, but is not limited to:  Fracture, dislocation, compartment syndrome, nerve injury/palsy, vascular injury, DVT, rhabdomyolysis, hemarthrosis, septic joint, cellulitis, bursitis, muscle strain, ligament tear/sprain, laceration, foreign body, abrasion, soft tissue contusion, osteoarthritis.      EKG   EKG ordered and independently reviewed by me:       Labs   Lab tests ordered and independently reviewed by me:    Labs Reviewed - No data to display      Imaging   Imaging ordered and independently reviewed by me:   Imaging Results    None              Additional Consideration   Lacey Mcelroy  presents to the emergency Department today with inadequate pain control upon discharge a few hours ago. Trialed going home; po pain meds not sufficient to capture pain. Will admit; pt is not a safe discharge.     Additional testing considered but not indicated during the course of this workup: further imaging and labwork, not indicated  Co-morbidities/chronic illness/exacerbation of chronic illness considered which impacted clinical decision making: advanced age, osteoporosis  Procedures done in the ED or plan for the OR: No  Social determinants of care considered during development of treatment plan include:   Discussion of management or test interpretation with external provider: Yes, describe: admitting team  DNR discussion: No    The patient's list of active medications and allergies as documented per RN staff has been reviewed.  Medications given in the ED and/or prescribed: Medications - No data to  display               Explanation of disposition: observation    Clinical Impression:     1. At risk for inadequate pain control    2. Compression fracture of lumbar vertebra with routine healing, unspecified lumbar vertebral level, subsequent encounter       ED Disposition Condition    Observation Stable               Aida Prince MD  06/01/23 0207

## 2023-06-01 NOTE — ED NOTES
Nurse let patients family member know patient would remain in ER for remainder of night due to full hospital. Daughter understood. Recliner brought in for more comfort.

## 2023-06-01 NOTE — H&P
[facility] Medicine H&P Note     Admitting Team: [facility] Hospitalist Team A  Attending Physician: Cyndi Arroyo MD  Resident: Harlan  Intern: Kimberly     Date of Admit: 6/1/2023    Chief Complaint     Mechanical fall x 1 day    Subjective:      History of Present Illness:  Lacey Mcelroy is a 82 y.o. female with a PMHx  HTN, osteoporosis, Chronic left shoulder pain, CKD3b who presented to the ED on 5/31/23 for a mechanical fall.  She reports tripping on her cane at around noon the day of presentation.  She reports landing on her right side.  She denies any loss of consciousness or dizziness prior to the fall.  The patient lives at home alone and the daughter checked in on the home video cameras and noticed that the patient wasn't on the couch like she normally would be. She called the patient and who then told her that she had fell on the ground.  The daughter then called EMS who took her to the ED.  The patient's down time was about 4 hours.  She complained of pain in her right hip, right elbow, lower back, and left leg.  X rays taken in the ED were negative for any acute fractures or dislocations but noted old fractures from previous falls in the thoracic and lumber spine.  CT Head and c-spine were negative.  The patient was discharged from the ED with plans for the daughter to help her back home and stay the night with her.  When they arrived at the patient's home, the daughter was unable to help the patient out of the car because she was having too much back and right hip pain with trying to stand.  She then decided to return to the ED.  The patient says she has had multiple falls in the past and attributes those falls to feeling dizzy and passing out.  That was not the case with this last fall.  She denies any chest pain, shortness of breath, current lightheadness or dizziness, abdominal pain, headache, confusion, change in vision, or other issues at this time.      Past Medical  History:  HTN  Osteoporosis  Chronic left shoulder pain  CKD3b    Past Surgical History:  Past Surgical History:   Procedure Laterality Date    ANKLE FRACTURE SURGERY  2009    right    CHOLECYSTECTOMY  2008       Allergies:  Review of patient's allergies indicates:   Allergen Reactions    Darvocet a500 [propoxyphene n-acetaminophen] Other (See Comments)     hallucinations    Lortab [hydrocodone-acetaminophen] Hives    Tramadol        Home Medications:  Tylenol 500 mg q6hr PRN  Ibuprofen 200 mg q6hr PRN  Vitamin D2 2000 U daily    Family History:  Family History   Problem Relation Age of Onset    Heart disease Father     Hypertension Father     Rheumatic fever Sister     Hypertension Sister     Heart disease Brother     Peripheral vascular disease Brother     Hypertension Brother     Diabetes type II Sister     Hypertension Sister     Heart disease Sister        Social History:  Social History     Tobacco Use    Smoking status: Never    Smokeless tobacco: Never   Substance Use Topics    Alcohol use: No       Review of Systems:  Pertinent items are noted in HPI. All other systems are reviewed and are negative.    Health Maintaince :   Primary Care Physician: Paulo Thomas MD    Immunizations:   TDap not UTD, 2005    Flu UTD   Pna PCV 13 in 2016    Cancer Screening:  PAP: aged out  MMG: likely aged out  Colonoscopy: likely aged out     Objective:   Last 24 Hour Vital Signs:  BP  Min: 120/63  Max: 141/66  Temp  Av °F (36.7 °C)  Min: 98 °F (36.7 °C)  Max: 98.1 °F (36.7 °C)  Pulse  Av.3  Min: 93  Max: 112  Resp  Av.5  Min: 16  Max: 18  SpO2  Av.3 %  Min: 87 %  Max: 97 %  There is no height or weight on file to calculate BMI.  No intake/output data recorded.    Physical Examination:  Physical Exam  Constitutional:       General: She is not in acute distress.     Appearance: Normal appearance.   HENT:      Head: Normocephalic and atraumatic.      Right Ear: External ear normal.      Left Ear:  External ear normal.      Nose: Nose normal.      Mouth/Throat:      Mouth: Mucous membranes are moist.      Pharynx: Oropharynx is clear.   Eyes:      General: No scleral icterus.     Extraocular Movements: Extraocular movements intact.      Conjunctiva/sclera: Conjunctivae normal.   Cardiovascular:      Rate and Rhythm: Normal rate and regular rhythm.      Pulses: Normal pulses.      Heart sounds: Normal heart sounds. No murmur heard.    No friction rub. No gallop.   Pulmonary:      Effort: Pulmonary effort is normal.      Breath sounds: Normal breath sounds. No wheezing, rhonchi or rales.   Chest:      Chest wall: No tenderness.   Abdominal:      General: Bowel sounds are normal. There is no distension.      Palpations: Abdomen is soft. There is no mass.      Tenderness: There is no abdominal tenderness. There is no guarding or rebound.   Musculoskeletal:         General: No swelling, deformity or signs of injury. Normal range of motion.      Cervical back: Normal range of motion. No tenderness.      Comments: Lower back pain with bilateral hip and knee flexion  Right hip pain with right hip and right knee flexion     Skin:     Coloration: Skin is not jaundiced or pale.      Findings: No rash.   Neurological:      General: No focal deficit present.      Mental Status: She is alert and oriented to person, place, and time.      Sensory: No sensory deficit.      Motor: No weakness.      Coordination: Coordination normal.         Laboratory:  Most Recent Data:  CBC:   Lab Results   Component Value Date    WBC 10.29 05/31/2023    HGB 10.8 (L) 05/31/2023    HCT 32.4 (L) 05/31/2023     05/31/2023    MCV 89 05/31/2023    RDW 12.7 05/31/2023     BMP:   Lab Results   Component Value Date     05/31/2023    K 3.0 (L) 05/31/2023     05/31/2023    CO2 22 (L) 05/31/2023    BUN 26 (H) 05/31/2023    CREATININE 0.9 05/31/2023     (H) 05/31/2023    CALCIUM 9.3 05/31/2023    MG 1.5 (L) 12/22/2021     LFTs:    Lab Results   Component Value Date    PROT 6.6 05/31/2023    ALBUMIN 3.5 05/31/2023    BILITOT 0.5 05/31/2023    AST 16 05/31/2023    ALKPHOS 88 05/31/2023    ALT 16 05/31/2023       DM:   Lab Results   Component Value Date    LDLCALC 120.0 06/15/2016    CREATININE 0.9 05/31/2023       Trended Lab Data:  Recent Labs   Lab 05/31/23  1851   WBC 10.29   HGB 10.8*   HCT 32.4*      MCV 89   RDW 12.7      K 3.0*      CO2 22*   BUN 26*   CREATININE 0.9   *   PROT 6.6   ALBUMIN 3.5   BILITOT 0.5   AST 16   ALKPHOS 88   ALT 16       Microbiology Data:  none    Other Results:  EKG (my interpretation): sinus tachycardia, rate 100, normal axis, normal intervals, no acute ischemic changes    Radiology:    CT Head wo contrast- No acute intracranial abnormality.  CT cervical spine- No acute fracture or subluxation of the cervical spine.  X ray right elbow- No acute osseous abnormality  X ray right humerus- No acute osseous abnormality.  X ray right shoulder- No acute osseous abnormality.  X ray left hip- No acute osseous abnormality.  X ray left femur- pending  X ray right hip- pending  X ray right femur- No acute osseous abnormality.  X ray lumbar spine- There is diffuse osteopenia.  There are numerous remote compression fractures of the visualized lower thoracic and the lumbar spine.  These involve the T11, T12, L1, L4, and L5 vertebral bodies.  Height loss is similar to prior.  No new acute fracture or subluxation.  There is dextroconvex scoliosis centered at approximately L2 or L3.  There is grade 1 anterolisthesis of L5 on S1.  There are right upper quadrant surgical clips.  There are vascular calcifications.  X ray thoracic spine- pending  X ray chest- pending     Assessment:     Lacey Mcelroy is a 82 y.o. female with a PMHx HTN, osteoporosis, Chronic left shoulder pain, CKD3b who presented to the ED on 5/31/23 for a mechanical fall landing on her right side.  Imaging for acute fractures,  dislocations, or intracranial bleed negative.  Old fractures of thoracolumbar spine noted.  Attempted discharge from the ED but patient returned to the hospital as her daughter was unable to transport patient out of car due to back pain.  She is admitted to medicine for pain management and safe dispo.     Plan:     Mechanical Fall  -patient tripped over her cane and landed on her right side; she was down for about 4 hours  -CT head and c-spine negative  -x rays of RUE and RLE negative for acute fractures or dislocations  -x ray lumbar spine showed old fractures from prior falls  -x ray thoracic spine, chest, and LLE pending  -lidocaine patch, PRN tylenol, PRN oxycodone, and PRN NSAIDS for RLE, right hip, and lower back pain (caution with opioids 2/2 patient sensitivity; caution with NSAIDs 2/2 hx CKD)  -fall precautions  -PT/OT to evaluate for discharge planning    Hx Syncopal episodes  -patient reports history of many episodes of loss of consciousness preceded by dizziness; last fall was not due to syncope per patient  -holter monitor in 2015 without rhythms that would cause cardiogenic syncope  -Last echo in 2021 with LVEF 60-65%, diastolic dysfunction, mildly calcified aortic valve, mild MR  -repeat Echo ordered  -orthostatic vitals    Hypokalemia  -potassium 3.0 on admission  -replete electrolytes PRN    Normocytic anemia  -Hgb 10.8, MCV 89  -Hx CKD per chart review  -GFR >60 this admission  -iron panel, B12, folate ordered    CKD stage 3b  -per chart review and on prior labs  -GFR > 60 this admission  -cautious with NSAIDS; however patient is sensitive to opioids so tylenol and NSAIDS may be necessary as long as kidney function remains adequate    Essential Hypertension  -previous diagnosis but no longer on medication and normotensive on admission    Osteoporosis  -high risk for fractures; x rays negative for acute fractures this admission, but evidence of remote fractures in thoracic and lumbar spine  noted  -continue Vitamin D supplementation  -fall precautions      Diet: Regular adult  DVT ppx: Lovenox  Dispo: pending PT/OT eval for falls    Code Status:     Full    Anmol Honeycutt MD  [facility] HO-II    [facility] Medicine Hospitalist Pager numbers:   [facility] Hospitalist Medicine Team A (Nicolette/Dina): [phone]  [facility] Hospitalist Medicine Team B (Nadine/Sherley):  [phone]

## 2023-06-01 NOTE — ED NOTES
Assumed care for pt who fell this afternoon in her home. Pt c/o head, neck and RUE and R hip pain. No obvious major trauma.     Review of patient's allergies indicates:   Allergen Reactions    Darvocet a500 [propoxyphene n-acetaminophen] Other (See Comments)     hallucinations    Lortab [hydrocodone-acetaminophen] Hives    Tramadol         Patient has verified the spelling of their name and  on armband.   APPEARANCE: Patient is alert, calm, oriented x 4, and does not appear distressed.  SKIN: Skin is normal for race, warm, and dry. Normal skin turgor and mucous membranes moist.  CARDIAC: Normal rate and rhythm, no murmur heard.   RESPIRATORY:Normal rate and effort. Breath sounds clear bilaterally throughout chest. Respirations are equal and unlabored.    GASTRO: Bowel sounds normal, abdomen is soft, no tenderness, and no abdominal distention.  MUSCLE: Full ROM. No bony tenderness or soft tissue tenderness. No obvious deformity.  PERIPHERAL VASCULAR: peripheral pulses present. Normal cap refill. No edema. Warm to touch.  NEURO: 5/5 strength major flexors/extensors bilaterally. Sensory intact to light touch bilaterally. Portal coma scale: eyes open spontaneously-4, oriented & converses-5, obeys commands-6. No neurological abnormalities.   MENTAL STATUS: awake, alert and aware of environment.  : Voids without complication    ED orders in progress. Pt SR up x 2. Bed in lowest position with wheels locked. Call bell within reach of pt.

## 2023-06-01 NOTE — PROGRESS NOTES
VIRTUAL NURSE:  Cued into patient's room.  Permission received per daughter, Angella, to turn camera to view patient.  Introduced as VN that will be working with floor nurse and nursing assistant.  Educated patient & daughter on VN's role in patient care and  [facility] model.  Plan of care reviewed with patient & daughter.  Education per flowsheet.   Informed patient & daughter that staff will round on them every 2 hours but to use call light for any other needs they may have; informed of fall risk and fall precautions.  Daughter verbalized understanding.  Call light within reach; bed siderails up x3.  Opportunity given for questions and questions answered.  Admission assessment questions answered.  Patient reports pain. Tylenol given by bedside nurse.  Instructed to call for assistance.  Will cont to monitor and intervene as needed. Daughter concerned about pain management. Will notify team.    Labs, notes, orders, and careplan reviewed.        06/01/23 0941   Admission   Initial VN Admission Questions Complete   Communication Issues? Patient Hearing   Shift   Virtual Nurse - Rounding Complete   Virtual Nurse - Patient Verbalized Approval Of Camera Use;VN Rounding   Safety/Activity   Patient Rounds bed in low position;bed wheels locked;visualized patient;call light in patient/parent reach   Safety Promotion/Fall Prevention Fall Risk reviewed with patient/family

## 2023-06-01 NOTE — ED NOTES
Received report from Alejandra RN; pt sleeping, daughter at bs, potassium infusing; in no acute distress, siderails x2, call light in reach and instructed how to use.

## 2023-06-01 NOTE — PLAN OF CARE
"Patient seen for physical therapy evaluation, co-evaluation with occupational therapy.  Patient's daughter present and anxious to have patient moved.  MD in room and assures that fractures are "old" and no new fractures present.  Patient continues with max complaints of back pain with functional mobility.  Patient performed log roll to left with min assist, mod assist for supine to sit.  Patient sat at EOB with SBA with B UE support, guarding in pain.  Patient performed sit to stand with Min assist and gait with RW x 8' with Min assist, very slow pace, requires max verbal cues for encouragement.  Daughter educated on log rolling, use of a gait belt, and car transfers.  Patient left up in chair, UE tremors while attempting to self feed.  Patient would benefit from moderate intensity therapy post acute, likely SNF.  If patient goes home from hospital, sallyetn will require Home Health PT/OT and aide, as well as  caregiver assist.  Also recommend a RW for home use. Full report to follow.    Problem: Physical Therapy  Goal: Physical Therapy Goal  Description: Goals to be met by: 2023     Patient will increase functional independence with mobility by performin. Supine to sit with MInimal Assistance  2. Sit to supine with MInimal Assistance  3. Rolling to Left and Right with Minimal Assistance.  4. Sit to stand transfer with Stand By Assistance  5. Bed to chair transfer with Stand-by Assistance using Rolling Walker  6. Gait  x 50 feet with Contact Guard Assistance using Rolling Walker.     Outcome: Ongoing, Progressing     "

## 2023-06-01 NOTE — ED PROVIDER NOTES
Emergency Department Encounter  Provider Note  Encounter Date: 5/31/2023    Patient Name: Lacey Mcelroy  MRN: [id number]    History of Present Illness   HPI  History of Present Illness:    Chief Complaint:   Chief Complaint   Patient presents with    Fall     Pt fell from standing around noon. Pt was on the floor until ems arrived. Pt had a spinal fracture from another fall 2 weeks ago. Pt has right hip pain. No shortening or rotation. Pt arrived from home with  EMS.        82-year-old female presenting with mechanical trip and fall today over her cane and was lying on the ground for about 4 hours.  Has complaints of pain all over her body.  Patient has compression fractures from a previous fall, seen here couple of weeks ago.    The following PMH/PSH/SocHx/FamHx has been reviewed by myself:    Past Medical History:   Diagnosis Date    Frozen shoulder     History of atrial fibrillation     History of torn meniscus of left knee     Hypertension     Osteoporosis      Past Surgical History:   Procedure Laterality Date    ANKLE FRACTURE SURGERY  2009    right    CHOLECYSTECTOMY  2008     Social History     Tobacco Use    Smoking status: Never    Smokeless tobacco: Never   Substance Use Topics    Alcohol use: No     Family History   Problem Relation Age of Onset    Heart disease Father     Hypertension Father     Rheumatic fever Sister     Hypertension Sister     Heart disease Brother     Peripheral vascular disease Brother     Hypertension Brother     Diabetes type II Sister     Hypertension Sister     Heart disease Sister        Allergies reviewed:  Review of patient's allergies indicates:   Allergen Reactions    Darvocet a500 [propoxyphene n-acetaminophen] Other (See Comments)     hallucinations    Lortab [hydrocodone-acetaminophen] Hives    Tramadol        Medications reviewed:  Medication List with Changes/Refills   New Medications    OXYCODONE-ACETAMINOPHEN (PERCOCET) 5-325 MG PER TABLET    Take 1 tablet by mouth  every 6 (six) hours as needed for Pain.   Current Medications    ACETAMINOPHEN (TYLENOL) 500 MG TABLET    Take 500 mg by mouth every 6 (six) hours as needed for Pain.    CHOLECALCIFEROL, VITAMIN D3, (VITAMIN D3) 50 MCG (2,000 UNIT) CAP    Take 1 capsule by mouth once daily.    FERROUS SULFATE (FEOSOL) TAB TABLET    Take 1 tablet (1 each total) by mouth daily with breakfast.    IBUPROFEN (ADVIL,MOTRIN) 200 MG TABLET    Take 200 mg by mouth every 6 (six) hours as needed for Pain.    MINERAL OIL/PETROLATUM,WHITE (REFRESH P.M. OPHT)    Apply to eye nightly.    MULTIVIT &MINERALS/FERROUS FUM (MULTI VITAMIN ORAL)    Take by mouth.    PROPYLENE GLYCOL//PF (SYSTANE, PF, OPHT)    Apply to eye 4 (four) times daily.       ROS  Review of Systems:    Constitutional:  Negative for fever.   HENT:  Negative for sore throat.    Eyes:  Negative for redness.   Respiratory:  Negative for shortness of breath.    Cardiovascular:  Negative for chest pain.   Gastrointestinal:  Negative for nausea.   Genitourinary:  Negative for dysuria.   Musculoskeletal:  Positive for back pain.   Skin:  Negative for rash.   Neurological:  Negative for weakness.   Hematological:  Does not bruise/bleed easily.   Psychiatric/Behavioral:  The patient is not nervous/anxious.      Physical Exam   Physical Exam    Initial Vitals   BP Pulse Resp Temp SpO2   05/31/23 1755 05/31/23 1755 05/31/23 1811 05/31/23 1755 05/31/23 1755   (!) 141/66 106 18 98.1 °F (36.7 °C) (!) 87 %      MAP       --                  Triage vital signs reviewed.    Constitutional:  Thin, elderly.  Nontoxic-appearing.  Not in extremis.  HENT: Normocephalic, atraumatic. Moist mucous membranes. Pt c/o midline total neck pain with palpation.  Eyes: No conjunctival injection.  Resp: Normal respiratory effort, breathing unlabored.  Cardio: Regular rate and rhythm.  GI: Abdomen non-distended.   MSK:  Pain with palpation of right shoulder, right elbow, left hip, right femur.  No open  areas.  Skin: Warm and dry. No rashes or lesions noted.  Neuro: Awake and alert. Moves all extremities.    ED Course   Procedures    Medical Decision Making    History Acquisition     The history is provided by the patient.   Assessment requiring an independent historian and why historian was needed:  Daughter at bedside giving most of history.  Patient is hard of hearing and did not bring her hearing aids.    Review of prior external/non ED notes:  Compression fractures of unknown time frame during last ED visit.    Differential Diagnoses   Based on available information and initial assessment, the working Differential Diagnosis includes, but is not limited to:  fall/syncope, traumatic SAH/intracranial bleed, skull/c-spine/facial fracture, concussion, neck injury, chest trauma, pelvic fracture, long bone fracture/dislocation, osteoarthritis, rhabdomyolysis, soft tissue contusion, muscle strain, ligament tear/sprain  .    EKG   EKG ordered and independently reviewed by me:       Labs   Lab tests ordered and independently reviewed by me:    Labs Reviewed   CBC W/ AUTO DIFFERENTIAL - Abnormal; Notable for the following components:       Result Value    RBC 3.63 (*)     Hemoglobin 10.8 (*)     Hematocrit 32.4 (*)     Gran # (ANC) 9.4 (*)     Lymph # 0.3 (*)     Gran % 91.1 (*)     Lymph % 3.2 (*)     All other components within normal limits   COMPREHENSIVE METABOLIC PANEL - Abnormal; Notable for the following components:    Potassium 3.0 (*)     CO2 22 (*)     Glucose 129 (*)     BUN 26 (*)     All other components within normal limits   CK   URINALYSIS, REFLEX TO URINE CULTURE         Imaging   Imaging ordered and independently reviewed by me:   Imaging Results              X-Ray Lumbar Spine Ap And Lateral (Final result)  Result time 05/31/23 21:53:08      Final result by Pedro Mckeon DO (05/31/23 21:53:08)                   Impression:      No acute fracture or subluxation of the lumbar spine.    Multiple  remote fractures of the lower thoracic and lumbar spine as above.      Electronically signed by: Pedro Mckeon  Date:    05/31/2023  Time:    21:53               Narrative:    EXAMINATION:  XR LUMBAR SPINE AP AND LATERAL    CLINICAL HISTORY:  fall;    TECHNIQUE:  AP, lateral and spot images were performed of the lumbar spine.    COMPARISON:  CT lumbar spine from 05/20/2023.    FINDINGS:  There is diffuse osteopenia.  There are numerous remote compression fractures of the visualized lower thoracic and the lumbar spine.  These involve the T11, T12, L1, L4, and L5 vertebral bodies.  Height loss is similar to prior.  No new acute fracture or subluxation.  There is dextroconvex scoliosis centered at approximately L2 or L3.  There is grade 1 anterolisthesis of L5 on S1.  There are right upper quadrant surgical clips.  There are vascular calcifications.                                       X-Ray Elbow Complete Right (Final result)  Result time 05/31/23 19:43:04      Final result by Pedro Mckeon DO (05/31/23 19:43:04)                   Impression:      No acute osseous abnormality.      Electronically signed by: Pedro Mckeon  Date:    05/31/2023  Time:    19:43               Narrative:    EXAMINATION:  XR SHOULDER COMPLETE 2 OR MORE VIEWS RIGHT; XR HUMERUS 2 VIEW RIGHT; XR ELBOW COMPLETE 3 VIEW RIGHT    CLINICAL HISTORY:  Unspecified fall, initial encounter    TECHNIQUE:  Two or three views of the right shoulder were performed.    Two views of the right humerus.    Three views of the right elbow.    COMPARISON:  07/29/2009.    FINDINGS:  There is diffuse osteopenia.    Right shoulder: No acute fracture or dislocation.  The humeral head is well seated in the glenoid.  Alignment is normal.  Acromioclavicular and glenohumeral joints demonstrate mild joint space narrowing.  Remaining visualized osseous structures are intact.    Right humerus: No acute fracture or dislocation.  Alignment is normal.    Right elbow: No  acute fracture or dislocation. Alignment is normal. Joint spaces are preserved. There is no elbow joint effusion.                                       X-Ray Femur Ap/Lat Right (Final result)  Result time 05/31/23 19:46:30      Final result by Pedro Mckeon DO (05/31/23 19:46:30)                   Impression:      No acute osseous abnormality.      Electronically signed by: Pedro Mckeon  Date:    05/31/2023  Time:    19:46               Narrative:    EXAMINATION:  XR FEMUR 2 VIEW RIGHT    CLINICAL HISTORY:  Unspecified fall, initial encounter    TECHNIQUE:  AP and lateral views of the proximal and distal right femur were performed.    COMPARISON:  None    FINDINGS:  There is osteopenia.  There is no acute fracture or dislocation.  Alignment is normal.                                       X-Ray Hip 2 or 3 views Left (with Pelvis when performed) (Final result)  Result time 05/31/23 19:46:12      Final result by Pedro Mckeon DO (05/31/23 19:46:12)                   Impression:      No acute osseous abnormality.      Electronically signed by: Pedro Mckeon  Date:    05/31/2023  Time:    19:46               Narrative:    EXAMINATION:  XR HIP WITH PELVIS WHEN PERFORMED, 2 OR 3 VIEWS LEFT    CLINICAL HISTORY:  fall;    TECHNIQUE:  AP view of the pelvis and frog leg lateral view of the left hip were performed.    COMPARISON:  01/26/2021.    FINDINGS:  There is diffuse osteopenia.  There is no evidence of an acute fracture or subluxation of the pelvis or left hip.  There are degenerative changes.  There are surgical clips overlying the right hemiabdomen.                                       X-Ray Shoulder Complete 2 View Right (Final result)  Result time 05/31/23 19:43:04      Final result by Pedro Mckeon DO (05/31/23 19:43:04)                   Impression:      No acute osseous abnormality.      Electronically signed by: Pedro Mckeon  Date:    05/31/2023  Time:    19:43               Narrative:     EXAMINATION:  XR SHOULDER COMPLETE 2 OR MORE VIEWS RIGHT; XR HUMERUS 2 VIEW RIGHT; XR ELBOW COMPLETE 3 VIEW RIGHT    CLINICAL HISTORY:  Unspecified fall, initial encounter    TECHNIQUE:  Two or three views of the right shoulder were performed.    Two views of the right humerus.    Three views of the right elbow.    COMPARISON:  07/29/2009.    FINDINGS:  There is diffuse osteopenia.    Right shoulder: No acute fracture or dislocation.  The humeral head is well seated in the glenoid.  Alignment is normal.  Acromioclavicular and glenohumeral joints demonstrate mild joint space narrowing.  Remaining visualized osseous structures are intact.    Right humerus: No acute fracture or dislocation.  Alignment is normal.    Right elbow: No acute fracture or dislocation. Alignment is normal. Joint spaces are preserved. There is no elbow joint effusion.                                       X-Ray Humerus 2 View Right (Final result)  Result time 05/31/23 19:43:04      Final result by Pedro Mckeon DO (05/31/23 19:43:04)                   Impression:      No acute osseous abnormality.      Electronically signed by: Pedro Mckeon  Date:    05/31/2023  Time:    19:43               Narrative:    EXAMINATION:  XR SHOULDER COMPLETE 2 OR MORE VIEWS RIGHT; XR HUMERUS 2 VIEW RIGHT; XR ELBOW COMPLETE 3 VIEW RIGHT    CLINICAL HISTORY:  Unspecified fall, initial encounter    TECHNIQUE:  Two or three views of the right shoulder were performed.    Two views of the right humerus.    Three views of the right elbow.    COMPARISON:  07/29/2009.    FINDINGS:  There is diffuse osteopenia.    Right shoulder: No acute fracture or dislocation.  The humeral head is well seated in the glenoid.  Alignment is normal.  Acromioclavicular and glenohumeral joints demonstrate mild joint space narrowing.  Remaining visualized osseous structures are intact.    Right humerus: No acute fracture or dislocation.  Alignment is normal.    Right elbow: No acute  fracture or dislocation. Alignment is normal. Joint spaces are preserved. There is no elbow joint effusion.                                       CT Cervical Spine Without Contrast (Final result)  Result time 05/31/23 19:16:02      Final result by Pedro Mckeon DO (05/31/23 19:16:02)                   Impression:      No acute fracture or subluxation of the cervical spine.      Electronically signed by: Pedro Mckeon  Date:    05/31/2023  Time:    19:16               Narrative:    EXAMINATION:  CT CERVICAL SPINE WITHOUT CONTRAST    CLINICAL HISTORY:  Neck trauma (Age >= 65y);    TECHNIQUE:  Low dose axial images, sagittal and coronal reformations were performed though the cervical spine without intravenous contrast.    COMPARISON:  CT cervical spine from 05/20/2023.    FINDINGS:  Alignment: There is mild anterolisthesis of C4 on C5.  Alignment is otherwise normal.    Vertebra: There is no acute fracture or subluxation of the cervical spine.  The vertebral body heights are maintained.    Discs: Mild disc height loss at C3-C4 and C5-C6.    Cord: The contents of the spinal canal are not well visualized on non-contrast CT.    Degenerative changes: There are multilevel degenerative changes of the cervical spine, not significantly changed in comparison with CT from 05/20/2023.    Miscellaneous: The soft tissues of the neck are unremarkable.  There are vascular calcifications.  The visualized lung apices are clear.                                       CT Head Without Contrast (Final result)  Result time 05/31/23 19:13:58      Final result by Pedro Mckeon DO (05/31/23 19:13:58)                   Impression:      No acute intracranial abnormality.      Electronically signed by: Pedro Mckeon  Date:    05/31/2023  Time:    19:13               Narrative:    EXAMINATION:  CT HEAD WITHOUT CONTRAST    CLINICAL HISTORY:  Head trauma, minor (Age >= 65y);    TECHNIQUE:  Low dose axial CT images obtained throughout the head  without intravenous contrast. Sagittal and coronal reconstructions were performed.    COMPARISON:  CT head from 05/20/2023.    FINDINGS:  There is brain parenchymal volume loss and prominence of the CSF spaces.  Ventricle size is unchanged from prior.  There is confluent hypoattenuation in the supratentorial white matter compatible with advanced chronic microvascular ischemic changes.  No extra-axial blood or fluid collections.  There are tentorial and falcine calcifications.  No parenchymal mass, hemorrhage, edema or CT evidence of an acute major vascular distribution infarct.    No calvarial fracture.  The scalp is unremarkable.  Bilateral paranasal sinuses and mastoid air cells are clear.  There are bilateral prosthetic lenses.                                           Additional Consideration   Lacey Mcelroy  presents to the emergency Department today with mechanical fall and multiple complaints.  CT head and neck, x-rays of her extremities that were causing pain on negative for any acute findings.  Patient is able to move her lower extremities and has known lumbar fractures.  Patient's labs are unremarkable, including CPK and creatinine.  Daughter feels comfortable taking the patient home and will stay with her tonight and tomorrow.  The plan is to have her stay with a family member so that she is not alone.  No indication for EKG or troponin as this was a witnessed mechanical fall via camera.    Additional testing considered but not indicated during the course of this workup: further imaging and labwork, not indicated  Co-morbidities/chronic illness/exacerbation of chronic illness considered which impacted clinical decision making:  Advanced age, osteoporosis  Procedures done in the ED or plan for the OR: No  Social determinants of care considered during development of treatment plan include: Decreased medical literacy  Discussion of management or test interpretation with external provider: No  DNR  discussion: No    The patient's list of active medications and allergies as documented per RN staff has been reviewed.  Medications given in the ED and/or prescribed:   Medications   ibuprofen tablet 600 mg (600 mg Oral Not Given 5/31/23 2059)   oxyCODONE-acetaminophen 5-325 mg per tablet 1 tablet (has no administration in time range)             ED Course as of 05/31/23 2220   Wed May 31, 2023   2013 CBC auto differential(!) [CS]   2013 CPK [CS]   2013 Comprehensive metabolic panel(!) [CS]   2013 X-Ray Femur Ap/Lat Right [CS]   2013 X-Ray Hip 2 or 3 views Left (with Pelvis when performed) [CS]   2013 X-Ray Shoulder Complete 2 View Right [CS]   2013 X-Ray Elbow Complete Right [CS]   2013 X-Ray Humerus 2 View Right [CS]   2013 CT Cervical Spine Without Contrast [CS]   2013 CT Head Without Contrast [CS]   2219 Patient was due to be discharged, but patient daughter asked for an x-ray of her lumbar spine.  This showed no new fractures.  Then discussed the possibility of getting opiates for her pain control.  This was granted although patient has a history of hives to Lortab.  Will give a dose before she leaves. [CS]      ED Course User Index  [CS] Aida Prince MD       Explanation of disposition:  Discharge        Clinical Impression:     1. Fall    2. Multiple contusions    3. Muscle strain         All results from the workup were reviewed with the patient/family in detail. I discussed with the patient and/or the family/caregiver that today's evaluation in the ED does not suggest any emergent or life-threatening medical conditions that would require hospitalization or immediate intervention beyond what was provided in the ED. I believe the patient is safe for discharge.  However, a reassuring evaluation in the ED does not preclude the presence or development of a serious or life-threatening condition. As such, strict return precautions were discussed with the patient expressing understanding of instructions, and all  questions answered. The patient/family communicates understanding of all this information and all remaining questions and concerns were addressed at this time.    The patient/family has been provided with verbal and printed direction regarding our final diagnosis(es) as well as instructions regarding use of OTC and/or Rx medications intended to manage the patient's aforementioned conditions including:  ED Prescriptions       Medication Sig Dispense Start Date End Date Auth. Provider    oxyCODONE-acetaminophen (PERCOCET) 5-325 mg per tablet Take 1 tablet by mouth every 6 (six) hours as needed for Pain. 12 tablet 5/31/2023 -- Aida Prince MD          The patient's condition does not warrant review of the  and prescription of controlled substances.      ED Disposition Condition    Discharge Stable               Aida Prince MD  05/31/23 2013       Aida Prince MD  05/31/23 5749

## 2023-06-01 NOTE — PLAN OF CARE
[facility]    FACILITY TRANSFER ORDERS      Patient Name: Lacey Mcelroy  YOB: 1940    PCP: Erica Valladares MD   PCP Address: [address] [place] [address] / [place]*  PCP Phone Number: [phone]  PCP Fax: [phone]    Encounter Date: 06/02/2023    Admit to: SNF    Vital Signs:  Routine    Diagnoses:   Active Hospital Problems    Diagnosis  POA    *Accident due to mechanical fall without injury [W19.XXXA]  Yes    Physical deconditioning [R53.81]  Yes    Hypokalemia [E87.6]  Yes    Hypomagnesemia [E83.42]  Yes    Compression fracture of lumbar vertebra [S32.000A]  Yes    Stage 3b chronic kidney disease [N18.32]  Yes     Chronic    Anemia in stage 3 chronic kidney disease [N18.30, D63.1]  Yes     Chronic      Resolved Hospital Problems   No resolved problems to display.       Allergies:  Review of patient's allergies indicates:   Allergen Reactions    Darvocet a500 [propoxyphene n-acetaminophen] Other (See Comments)     hallucinations    Lortab [hydrocodone-acetaminophen] Hives    Tramadol        Diet: regular diet    Activities: Activity as tolerated and Up with assistance    Goals of Care Treatment Preferences:  Code Status: Full Code      Nursing: NC O2 prn for goal O2>92%    Labs:  None          CONSULTS:    Physical Therapy to evaluate and treat.  and Occupational Therapy to evaluate and treat.    MISCELLANEOUS CARE:  NA    WOUND CARE ORDERS  None    Medications: Review discharge medications with patient and family and provide education.      Current Discharge Medication List        START taking these medications    Details   cyanocobalamin (VITAMIN B-12) 1000 MCG tablet Take 1 tablet (1,000 mcg total) by mouth once daily.      gabapentin (NEURONTIN) 100 MG capsule Take 1 capsule (100 mg total) by mouth every evening.  Qty: 30 capsule, Refills: 11      LIDOcaine (LIDODERM) 5 % Place 3 patches onto the skin once daily. Remove & Discard patch within 12  hours or as directed by MD  Refills: 0           CONTINUE these medications which have NOT CHANGED    Details   acetaminophen (TYLENOL) 500 MG tablet Take 500 mg by mouth every 6 (six) hours as needed for Pain.      cholecalciferol, vitamin D3, (VITAMIN D3) 50 mcg (2,000 unit) Cap Take 1 capsule by mouth once daily.      ibuprofen (ADVIL,MOTRIN) 200 MG tablet Take 200 mg by mouth every 6 (six) hours as needed for Pain.      ferrous sulfate (FEOSOL) Tab tablet Take 1 tablet (1 each total) by mouth daily with breakfast.  Qty: 90 tablet, Refills: 3    Associated Diagnoses: Anemia, unspecified type      MINERAL OIL/PETROLATUM,WHITE (REFRESH P.M. OPHT) Apply to both eyes nightly.      MULTIVIT &MINERALS/FERROUS FUM (MULTI VITAMIN ORAL) Take by mouth.      PROPYLENE GLYCOL//PF (SYSTANE, PF, OPHT) Apply to both eyes 4 (four) times daily. OK to hold while in SNF           STOP taking these medications       oxyCODONE-acetaminophen (PERCOCET) 5-325 mg per tablet Comments:   Reason for Stopping:                  Immunizations Administered as of 6/2/2023       Name Date Dose VIS Date Route Exp Date    COVID-19, MRNA, LN-S, PF (Pfizer) (Purple Cap) 8/2/2021 -- -- Intramuscular --    Site: Right deltoid     : [employer]     Lot: [id number]             This patient has had both covid vaccinations    Some patients may experience side effects after vaccination.  These may include fever, headache, muscle or joint aches.  Most symptoms resolve with 24-48 hours and do not require urgent medical evaluation unless they persist for more than 72 hours or symptoms are concerning for an unrelated medical condition.          _________________________________  Erica Claros MD  06/02/2023

## 2023-06-01 NOTE — PLAN OF CARE
Problem: Occupational Therapy  Goal: Occupational Therapy Goal  Description: Goals to be met by: 6/30/2023     Patient will increase functional independence with ADLs by performing:    UE Dressing with Modified [place].  LE Dressing with Stand-by Assistance.  Grooming while seated with Modified [place].  Toileting from bedside commode with Stand-by Assistance for hygiene and clothing management.   Supine to sit with Modified [place].  Toilet transfer to bedside commode with Stand-by Assistance.  Upper extremity exercise program x10 reps per handout, with supervision.    Outcome: Ongoing, Progressing   OT initial eval completed and tx initiated. Pt c/o significant back pain with movement, pain patches in place to thoracic and lumbar spine, pt not rated. Pt. Performed bed mobility Min A to log roll to left, Mod A supine.sit, sat SBA with UE support on bed, guarded in pain. She performed Sit<>stand Min A and Stand step t/f with Min A to RW, pt needed extra time to perform all movement and transfer due to pain.  Daughter was present and family training completed in above.  Pt. exhibited UE tremors when trying to feed herself and needed min A to perform task. Pt. Left sitting UIC with daughter at bedside. Pt would benefit from moderate intensity therapy post acute likely SNF.  If pt. goes home from hospital,  HHOT, HH aide, 24/7 caregiver assist and RW is recommended. Continue with OT POC.

## 2023-06-01 NOTE — ED NOTES
Pt requested that we wait to discharge her so that she can finish urinating in external catheter device.

## 2023-06-01 NOTE — ED NOTES
RN first encounter with pt. Pt lying in stretcher with daughter at the bedside. Pt seen earlier today and discharged after having fall. Pt had fall in November that resulted in lumbar fractures. Per pts daughter, pt has suffered new fractures in her back from the most recent fall. Pts daughter attempted to get patient home but the patient's pain was too severe, she could  not get the pt out of the car. Pts daughter stated the pt cannot even get to a bedside commode to use the restroom. Pt was advised to return to ER. Pt states pain at a resting state is a 0 but with any movement increases to a 10.

## 2023-06-01 NOTE — PLAN OF CARE
JEANETTE placed call to [facility] to call in LOCET (Level of Care Eligibility Tool)   SW faxed PASRR (Level 1Pre-Admission Screening and Resident Review)  to [facility].   JEANETTE to await 142 for placement.     MI Washburn  [phone]       06/01/23 1538   Post-Acute Status   Post-Acute Authorization Placement   Post-Acute Placement Status Referrals Sent   Hospital Resources/Appts/Education Provided Appointments scheduled and added to AVS   Discharge Delays None known at this time   Discharge Plan   Discharge Plan A Skilled Nursing Facility

## 2023-06-01 NOTE — PLAN OF CARE
JEANETTE met with pt and pt's drt Patricia [phone] at bedside this AM to complete DCA. Patricia reported that the pt does live alone at home but Patricia recently purchased her mother a home next to hers at [address], [place] [address]. Pt has a rw, rollator and cane at home. SW requested your f/u appts. White board updated with CM name and contact information.  Discharge brochure provided.  Pt encouraged to call with any questions or concerns.  Cm will continue to follow pt through transitions of care and assist with any discharge needs.    MI Washburn  [phone]    Future Appointments   Date Time Provider Department Center   6/1/2023 10:15 AM KN PORTXR1 KN XRAY [facility]   6/1/2023 10:20 AM KN PORTXR1 [facility] X[facility]   6/1/2023 10:25 AM KN PORTXR1 [facility]        06/01/23 1006   Discharge Planning   Assessment Type Discharge Planning Brief Assessment   Resource/Environmental Concerns none   Support Systems Family members;Children   Equipment Currently Used at Home walker, rolling;rollator;cane, straight   Current Living Arrangements home   Care Facility Name home   Patient/Family Anticipates Transition to home with family   Patient/Family Anticipated Services at Transition none   DME Needed Upon Discharge  none   Discharge Plan A Home with family

## 2023-06-01 NOTE — PT/OT/SLP EVAL
"Physical Therapy Evaluation    Patient Name:  Lacey Mcelroy   MRN:  [id number]    Recommendations:     Discharge Recommendations: other (see comments) (Moderate Intensive Therapy)   Discharge Equipment Recommendations: walker, rolling   Barriers to discharge: Decreased caregiver support and requires assist for functional mobility, max pain in spine    Assessment:     Lacey Mcelroy is a 82 y.o. female admitted with a medical diagnosis of Accident due to mechanical fall without injury.  She presents with the following impairments/functional limitations: weakness, impaired functional mobility, impaired cognition, decreased safety awareness, impaired endurance, pain, gait instability, impaired balance, impaired self care skills, decreased lower extremity function, orthopedic precautions     Patient seen for physical therapy evaluation, co-evaluation with occupational therapy.  Patient's daughter present and anxious to have patient moved.  MD in room and assures that fractures are "old" and no new fractures present.  Patient continues with max complaints of back pain with functional mobility.  Patient performed log roll to left with min assist, mod assist for supine to sit.  Patient sat at EOB with SBA with B UE support, guarding in pain.  Patient performed sit to stand with Min assist and gait with RW x 8' with Min assist, very slow pace, requires max verbal cues for encouragement.  Daughter educated on log rolling, use of a gait belt, and car transfers.  Patient left up in chair, UE tremors while attempting to self feed.  Patient would benefit from moderate intensity therapy post acute, likely SNF.  If patient goes home from hospital, eleazarn will require Home Health PT/OT and aide, as well as 24/7 caregiver assist.  Also recommend a RW for home use. Full report to follow.    Rehab Prognosis: Fair; patient would benefit from acute skilled PT services to address these deficits and reach maximum level of function.  "   Recent Surgery: * No surgery found *      Plan:     During this hospitalization, patient to be seen 5 x/week to address the identified rehab impairments via gait training, therapeutic activities, therapeutic exercises, neuromuscular re-education and progress toward the following goals:    Plan of Care Expires:  07/01/23    Subjective     Chief Complaint: Daughter reports patient is in max pain  Patient/Family Comments/goals: To return home once stronger  Pain/Comfort:  Pain Rating 1:  (max in back, but does not rate)  Pain Rating Post-Intervention 1:  (does not rate)    Patients cultural, spiritual, Hindu conflicts given the current situation: no    Living Environment:  Patient will be moving to a home next to her daughter's:  1 SH, ramp to enter home, T/S in bathroom.  Prior to admission, patients level of function was walking household distances with her cane, modified independent with ADLs, recent falls.  Equipment used at home: cane, straight, bath bench, rollator, shower chair.  DME owned (not currently used): none.  Upon discharge, patient will have assistance from family.    Objective:     Communicated with nurse Rogers prior to session.  Patient found supine with bed alarm, peripheral IV, telemetry, PureWick  upon PT entry to room.    General Precautions: Standard, fall  Orthopedic Precautions:spinal precautions   Braces: N/A  Respiratory Status: Room air    Exams:  Cognitive Exam:  Patient is oriented to Person, Place, and follows commands  Gross Motor Coordination:  Decreased to B LE/trunk due to weakness and pain  Postural Exam:  Patient presented with the following abnormalities:    -       Rounded shoulders  -       Forward head  -       Kyphosis  -       cachetic, osteoperotic  Skin Integrity/Edema:      -       Skin integrity: Dry  B LE ROM/Strength:  Full assessment not completed due to pain, functionally moves B LEs with gait and transfers    Functional Mobility:  Bed Mobility:     Rolling  Left:  minimum assistance  Supine to Sit: maximal assistance  Transfers:     Sit to Stand:  minimum assistance with rolling walker  Gait: 8' with RW and min assist, very slow pace, VC's for sequencing and encouragement to continue  Balance: Seated: kyphotic, CGA, guarded due to pain   Standing;  requires RW, Min Assist      AM-PAC 6 CLICK MOBILITY  Total Score:14       Treatment & Education:  Patient and daughter educated on Spine Precautions.  Patient and daughter educated on log rolling technique.  Patient and daughter educated on use of gait belt and car transfers.  Patient's daughter unsure if patient has a RW for home use.      Patient left up in chair with all lines intact, call button in reach, and daughter present.    GOALS:   Multidisciplinary Problems       Physical Therapy Goals          Problem: Physical Therapy    Goal Priority Disciplines Outcome Goal Variances Interventions   Physical Therapy Goal     PT, PT/OT Ongoing, Progressing     Description: Goals to be met by: 2023     Patient will increase functional independence with mobility by performin. Supine to sit with MInimal Assistance  2. Sit to supine with MInimal Assistance  3. Rolling to Left and Right with Minimal Assistance.  4. Sit to stand transfer with Stand By Assistance  5. Bed to chair transfer with Stand-by Assistance using Rolling Walker  6. Gait  x 50 feet with Contact Guard Assistance using Rolling Walker.                          History:     Past Medical History:   Diagnosis Date    Frozen shoulder     History of atrial fibrillation     History of torn meniscus of left knee     Hypertension     Osteoporosis        Past Surgical History:   Procedure Laterality Date    ANKLE FRACTURE SURGERY  2009    right    CHOLECYSTECTOMY         Time Tracking:     PT Received On: 23  PT Start Time: 1311    PT Stop Time:  1343  PT Total Time (min): 32 min     Billable Minutes: Evaluation 8 and Gait Training 8      2023

## 2023-06-01 NOTE — PROGRESS NOTES
Pharmacist Renal Dose Adjustment Note    Lacey Mcelroy is a 82 y.o. female being treated with the medication enoxaparin    Patient Data:    Vital Signs (Most Recent):  Temp: 96.9 °F (36.1 °C) (06/01/23 0830)  Pulse: 107 (06/01/23 0830)  Resp: 18 (06/01/23 0830)  BP: (!) 155/67 (06/01/23 0830)  SpO2: (!) 94 % (06/01/23 0802) Vital Signs (72h Range):  Temp:  [96.9 °F (36.1 °C)-98.1 °F (36.7 °C)]   Pulse:  []   Resp:  [16-18]   BP: (116-177)/(58-75)   SpO2:  [87 %-97 %]      Recent Labs   Lab 05/31/23  1851 06/01/23  0510   CREATININE 0.9 1.1     Serum creatinine: 1.1 mg/dL 06/01/23 0510  Estimated creatinine clearance: 27.3 mL/min    Medication:enoxaparin dose: 40mg frequency q24h will be changed to medication:enoxaparin dose:30mg frequency:q24h    Pharmacist's Name: Silas Bullard  Pharmacist's Extension: [phone]

## 2023-06-01 NOTE — PLAN OF CARE
JEANETTE met with pt and pt's ella Patricia [phone] at bedside to discuss d/c planning. Patricia would like her mother to discharge to [facility] Phone: [phone] or [facility] Phone: [phone] as her first and second choice of facility. JEANETTE reached out to Miriam with [facility]. JEANETTE called Neo at [facility] he stated that they are on a hold and cannot accept a new pts until next week. JEANETTE sent additional SNF referrals. JEANETTE will follow.     3:45 Miriam with [facility] will submit for auth for admit tomorrow.     IM Washburn  [phone]       06/01/23 1428   Post-Acute Status   Post-Acute Authorization Placement   Post-Acute Placement Status Referrals Sent   Discharge Delays None known at this time   Discharge Plan   Discharge Plan A Skilled Nursing Facility

## 2023-06-02 ENCOUNTER — HOSPITAL ENCOUNTER (INPATIENT)
Facility: HOSPITAL | Age: 83
LOS: 3 days | Discharge: HOME HEALTH | DRG: 556 | End: 2023-06-05
Attending: HOSPITALIST | Admitting: HOSPITALIST
Payer: MEDICARE

## 2023-06-02 VITALS
TEMPERATURE: 98 F | HEART RATE: 86 BPM | WEIGHT: 100 LBS | SYSTOLIC BLOOD PRESSURE: 138 MMHG | BODY MASS INDEX: 19.63 KG/M2 | DIASTOLIC BLOOD PRESSURE: 60 MMHG | RESPIRATION RATE: 20 BRPM | OXYGEN SATURATION: 100 % | HEIGHT: 60 IN
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
## 2023-06-02 DIAGNOSIS — N18.32 CHRONIC KIDNEY DISEASE (CKD) STAGE G3B/A1, MODERATELY DECREASED GLOMERULAR FILTRATION RATE (GFR) BETWEEN 30-44 ML/MIN/1.73 SQUARE METER AND ALBUMINURIA CREATININE RATIO LESS THAN 30 MG/G: ICD-10-CM

## 2023-06-02 PROBLEM — R09.02 HYPOXIA: Status: ACTIVE | Noted: 2023-06-02

## 2023-06-02 LAB
ALBUMIN SERPL BCP-MCNC: 3 G/DL (ref 3.5–5.2)
ALP SERPL-CCNC: 84 U/L (ref 55–135)
ALT SERPL W/O P-5'-P-CCNC: 16 U/L (ref 10–44)
ANION GAP SERPL CALC-SCNC: 11 MMOL/L (ref 8–16)
AST SERPL-CCNC: 23 U/L (ref 10–40)
BASOPHILS # BLD AUTO: 0.01 K/UL (ref 0–0.2)
BASOPHILS NFR BLD: 0.2 % (ref 0–1.9)
BILIRUB SERPL-MCNC: 0.7 MG/DL (ref 0.1–1)
BUN SERPL-MCNC: 25 MG/DL (ref 8–23)
CALCIUM SERPL-MCNC: 8.8 MG/DL (ref 8.7–10.5)
CHLORIDE SERPL-SCNC: 105 MMOL/L (ref 95–110)
CO2 SERPL-SCNC: 22 MMOL/L (ref 23–29)
CREAT SERPL-MCNC: 1 MG/DL (ref 0.5–1.4)
DIFFERENTIAL METHOD: ABNORMAL
EOSINOPHIL # BLD AUTO: 0 K/UL (ref 0–0.5)
EOSINOPHIL NFR BLD: 0.6 % (ref 0–8)
ERYTHROCYTE [DISTWIDTH] IN BLOOD BY AUTOMATED COUNT: 12.9 % (ref 11.5–14.5)
EST. GFR  (NO RACE VARIABLE): 56 ML/MIN/1.73 M^2
GLUCOSE SERPL-MCNC: 99 MG/DL (ref 70–110)
HCT VFR BLD AUTO: 29.1 % (ref 37–48.5)
HGB BLD-MCNC: 9.7 G/DL (ref 12–16)
IMM GRANULOCYTES # BLD AUTO: 0.02 K/UL (ref 0–0.04)
IMM GRANULOCYTES NFR BLD AUTO: 0.3 % (ref 0–0.5)
LYMPHOCYTES # BLD AUTO: 0.8 K/UL (ref 1–4.8)
LYMPHOCYTES NFR BLD: 12.2 % (ref 18–48)
MAGNESIUM SERPL-MCNC: 2.2 MG/DL (ref 1.6–2.6)
MCH RBC QN AUTO: 30.5 PG (ref 27–31)
MCHC RBC AUTO-ENTMCNC: 33.3 G/DL (ref 32–36)
MCV RBC AUTO: 92 FL (ref 82–98)
MONOCYTES # BLD AUTO: 0.3 K/UL (ref 0.3–1)
MONOCYTES NFR BLD: 4.3 % (ref 4–15)
NEUTROPHILS # BLD AUTO: 5.2 K/UL (ref 1.8–7.7)
NEUTROPHILS NFR BLD: 82.4 % (ref 38–73)
NRBC BLD-RTO: 0 /100 WBC
PHOSPHATE SERPL-MCNC: 3 MG/DL (ref 2.7–4.5)
PLATELET # BLD AUTO: 119 K/UL (ref 150–450)
PMV BLD AUTO: 9.6 FL (ref 9.2–12.9)
POTASSIUM SERPL-SCNC: 3.5 MMOL/L (ref 3.5–5.1)
PROT SERPL-MCNC: 6 G/DL (ref 6–8.4)
RBC # BLD AUTO: 3.18 M/UL (ref 4–5.4)
SARS-COV-2 RDRP RESP QL NAA+PROBE: NEGATIVE
SODIUM SERPL-SCNC: 138 MMOL/L (ref 136–145)
WBC # BLD AUTO: 6.25 K/UL (ref 3.9–12.7)

## 2023-06-02 PROCEDURE — U0002 COVID-19 LAB TEST NON-CDC: HCPCS | Performed by: INTERNAL MEDICINE

## 2023-06-02 PROCEDURE — 84100 ASSAY OF PHOSPHORUS: CPT | Performed by: STUDENT IN AN ORGANIZED HEALTH CARE EDUCATION/TRAINING PROGRAM

## 2023-06-02 PROCEDURE — 25000003 PHARM REV CODE 250: Performed by: STUDENT IN AN ORGANIZED HEALTH CARE EDUCATION/TRAINING PROGRAM

## 2023-06-02 PROCEDURE — 85025 COMPLETE CBC W/AUTO DIFF WBC: CPT | Performed by: STUDENT IN AN ORGANIZED HEALTH CARE EDUCATION/TRAINING PROGRAM

## 2023-06-02 PROCEDURE — G0378 HOSPITAL OBSERVATION PER HR: HCPCS

## 2023-06-02 PROCEDURE — 25000003 PHARM REV CODE 250: Performed by: HOSPITALIST

## 2023-06-02 PROCEDURE — 25000003 PHARM REV CODE 250: Performed by: NURSE PRACTITIONER

## 2023-06-02 PROCEDURE — 83735 ASSAY OF MAGNESIUM: CPT | Performed by: STUDENT IN AN ORGANIZED HEALTH CARE EDUCATION/TRAINING PROGRAM

## 2023-06-02 PROCEDURE — 11000004 HC SNF PRIVATE

## 2023-06-02 PROCEDURE — 80053 COMPREHEN METABOLIC PANEL: CPT | Performed by: STUDENT IN AN ORGANIZED HEALTH CARE EDUCATION/TRAINING PROGRAM

## 2023-06-02 PROCEDURE — 96374 THER/PROPH/DIAG INJ IV PUSH: CPT | Mod: 59

## 2023-06-02 PROCEDURE — 63600175 PHARM REV CODE 636 W HCPCS: Performed by: STUDENT IN AN ORGANIZED HEALTH CARE EDUCATION/TRAINING PROGRAM

## 2023-06-02 PROCEDURE — 97530 THERAPEUTIC ACTIVITIES: CPT | Mod: CQ

## 2023-06-02 PROCEDURE — 25000003 PHARM REV CODE 250

## 2023-06-02 RX ORDER — ACETAMINOPHEN 325 MG/1
650 TABLET ORAL EVERY 6 HOURS PRN
Status: DISCONTINUED | OUTPATIENT
Start: 2023-06-02 | End: 2023-06-03

## 2023-06-02 RX ORDER — AMOXICILLIN 250 MG
1 CAPSULE ORAL 2 TIMES DAILY
Status: DISCONTINUED | OUTPATIENT
Start: 2023-06-02 | End: 2023-06-05 | Stop reason: HOSPADM

## 2023-06-02 RX ORDER — LANOLIN ALCOHOL/MO/W.PET/CERES
1 CREAM (GRAM) TOPICAL DAILY
Status: CANCELLED | OUTPATIENT
Start: 2023-06-03

## 2023-06-02 RX ORDER — LANOLIN ALCOHOL/MO/W.PET/CERES
1000 CREAM (GRAM) TOPICAL DAILY
Start: 2023-06-02

## 2023-06-02 RX ORDER — GABAPENTIN 100 MG/1
100 CAPSULE ORAL NIGHTLY
Status: DISCONTINUED | OUTPATIENT
Start: 2023-06-02 | End: 2023-06-02 | Stop reason: HOSPADM

## 2023-06-02 RX ORDER — ACETAMINOPHEN 500 MG
500 TABLET ORAL EVERY 6 HOURS PRN
Status: DISCONTINUED | OUTPATIENT
Start: 2023-06-02 | End: 2023-06-03

## 2023-06-02 RX ORDER — DIPHENHYDRAMINE HCL 25 MG
25 CAPSULE ORAL EVERY 6 HOURS PRN
Status: DISCONTINUED | OUTPATIENT
Start: 2023-06-02 | End: 2023-06-03

## 2023-06-02 RX ORDER — GABAPENTIN 100 MG/1
100 CAPSULE ORAL NIGHTLY
Status: DISCONTINUED | OUTPATIENT
Start: 2023-06-02 | End: 2023-06-03

## 2023-06-02 RX ORDER — OXYCODONE HYDROCHLORIDE 5 MG/1
5 TABLET ORAL EVERY 8 HOURS PRN
Refills: 0
Start: 2023-06-02 | End: 2023-06-02 | Stop reason: HOSPADM

## 2023-06-02 RX ORDER — IBUPROFEN 200 MG
200 TABLET ORAL EVERY 6 HOURS PRN
Status: DISCONTINUED | OUTPATIENT
Start: 2023-06-02 | End: 2023-06-02

## 2023-06-02 RX ORDER — LIDOCAINE 50 MG/G
3 PATCH TOPICAL
Status: DISCONTINUED | OUTPATIENT
Start: 2023-06-02 | End: 2023-06-05 | Stop reason: HOSPADM

## 2023-06-02 RX ORDER — TALC
6 POWDER (GRAM) TOPICAL NIGHTLY PRN
Status: DISCONTINUED | OUTPATIENT
Start: 2023-06-02 | End: 2023-06-05 | Stop reason: HOSPADM

## 2023-06-02 RX ORDER — LANOLIN ALCOHOL/MO/W.PET/CERES
1000 CREAM (GRAM) TOPICAL DAILY
Status: DISCONTINUED | OUTPATIENT
Start: 2023-06-02 | End: 2023-06-02 | Stop reason: HOSPADM

## 2023-06-02 RX ORDER — LANOLIN ALCOHOL/MO/W.PET/CERES
1000 CREAM (GRAM) TOPICAL DAILY
Status: DISCONTINUED | OUTPATIENT
Start: 2023-06-03 | End: 2023-06-05 | Stop reason: HOSPADM

## 2023-06-02 RX ORDER — GABAPENTIN 100 MG/1
100 CAPSULE ORAL NIGHTLY
Qty: 30 CAPSULE | Refills: 11 | Status: ON HOLD
Start: 2023-06-02 | End: 2023-06-05 | Stop reason: HOSPADM

## 2023-06-02 RX ORDER — OXYCODONE HYDROCHLORIDE 5 MG/1
5 TABLET ORAL EVERY 4 HOURS PRN
Status: DISCONTINUED | OUTPATIENT
Start: 2023-06-02 | End: 2023-06-03

## 2023-06-02 RX ORDER — OXYCODONE HYDROCHLORIDE 10 MG/1
10 TABLET ORAL EVERY 4 HOURS PRN
Status: DISCONTINUED | OUTPATIENT
Start: 2023-06-02 | End: 2023-06-03

## 2023-06-02 RX ORDER — LANOLIN ALCOHOL/MO/W.PET/CERES
1 CREAM (GRAM) TOPICAL DAILY
Status: DISCONTINUED | OUTPATIENT
Start: 2023-06-03 | End: 2023-06-03

## 2023-06-02 RX ORDER — HYDROMORPHONE HYDROCHLORIDE 1 MG/ML
0.2 INJECTION, SOLUTION INTRAMUSCULAR; INTRAVENOUS; SUBCUTANEOUS ONCE
Status: COMPLETED | OUTPATIENT
Start: 2023-06-02 | End: 2023-06-02

## 2023-06-02 RX ORDER — CALCIUM CARBONATE 200(500)MG
500 TABLET,CHEWABLE ORAL 2 TIMES DAILY PRN
Status: DISCONTINUED | OUTPATIENT
Start: 2023-06-02 | End: 2023-06-05 | Stop reason: HOSPADM

## 2023-06-02 RX ORDER — CHOLECALCIFEROL (VITAMIN D3) 25 MCG
2000 TABLET ORAL DAILY
Status: DISCONTINUED | OUTPATIENT
Start: 2023-06-03 | End: 2023-06-05 | Stop reason: HOSPADM

## 2023-06-02 RX ADMIN — LIDOCAINE 3 PATCH: 50 PATCH TOPICAL at 01:06

## 2023-06-02 RX ADMIN — OXYCODONE HYDROCHLORIDE 5 MG: 5 TABLET ORAL at 01:06

## 2023-06-02 RX ADMIN — SENNOSIDES AND DOCUSATE SODIUM 1 TABLET: 50; 8.6 TABLET ORAL at 09:06

## 2023-06-02 RX ADMIN — OXYCODONE HYDROCHLORIDE 5 MG: 5 TABLET ORAL at 10:06

## 2023-06-02 RX ADMIN — OXYCODONE HYDROCHLORIDE 10 MG: 10 TABLET ORAL at 05:06

## 2023-06-02 RX ADMIN — HYDROMORPHONE HYDROCHLORIDE 0.2 MG: 1 INJECTION, SOLUTION INTRAMUSCULAR; INTRAVENOUS; SUBCUTANEOUS at 04:06

## 2023-06-02 RX ADMIN — DIPHENHYDRAMINE HYDROCHLORIDE 25 MG: 25 CAPSULE ORAL at 05:06

## 2023-06-02 RX ADMIN — GABAPENTIN 100 MG: 100 CAPSULE ORAL at 09:06

## 2023-06-02 NOTE — PLAN OF CARE
SW met with pt and pt's drt at bedside to complete final assessment. Pt will dc today to [facility]. Pt's drt signed pt choice form and sw charted. Pt is pending number for report at facility. Sw requested fu appts. Rounds completed on pt. All questions addressed. Bedside nurse to discuss d/c medications. Discussed importance to attend all f/u appts and take medications as prescribed. Verbalized understanding. Case Management to sign off. SW will set up transportation once report info is secured.     transportation for 3pm and call report to the nurses station at [phone]      MI Washburn  [phone]       06/02/23 1145   Final Note   Assessment Type Final Discharge Note   Anticipated Discharge Disposition SNF   What phone number can be called within the next 1-3 days to see how you are doing after discharge? [phone]   Hospital Resources/Appts/Education Provided Appointments scheduled and added to AVS   Post-Acute Status   Post-Acute Authorization Placement   Post-Acute Placement Status Set-up Complete/Auth obtained   Discharge Delays None known at this time

## 2023-06-02 NOTE — PT/OT/SLP EVAL
Occupational Therapy   Evaluation, tx    Name: Lacey Mcelroy  MRN: [id number]  Admitting Diagnosis: Accident due to mechanical fall without injury  Recent Surgery: * No surgery found *    The primary encounter diagnosis was Accident due to mechanical fall without injury, initial encounter. Diagnoses of At risk for inadequate pain control, Compression fracture of lumbar vertebra with routine healing, unspecified lumbar vertebral level, subsequent encounter, Physical deconditioning, Hypokalemia, Hypomagnesemia, Anemia in stage 3b chronic kidney disease, Stage 3b chronic kidney disease, and Compression fracture of lumbar vertebra, unspecified lumbar vertebral level, initial encounter were also pertinent to this visit.    Recommendations:     Discharge Recommendations:  (moderate intensity therapy)  Discharge Equipment Recommendations:  walker, rolling  Barriers to discharge:  None    Assessment:     Lacey Mcelroy is a 82 y.o. female with a medical diagnosis of Accident due to mechanical fall without injury.  She presents with Performance deficits affecting function: impaired endurance, impaired self care skills, impaired functional mobility, gait instability, impaired balance, impaired cognition, decreased coordination, decreased upper extremity function, decreased lower extremity function, decreased safety awareness, pain, decreased ROM, orthopedic precautions.  OT initial eval completed and tx initiated. Pt c/o significant back pain with movement, pain patches in place to thoracic and lumbar spine, pt not rated. Pt. Performed bed mobility Min A to log roll to left, Mod A supine.sit, sat SBA with UE support on bed, guarded in pain. She performed Sit<>stand Min A and Stand step t/f with Min A to RW, pt needed extra time to perform all movement and transfer due to pain.  Daughter was present and family training completed in above.  Pt. exhibited UE tremors when trying to feed herself and needed min A to perform  task. Pt. Left sitting UIC with daughter at bedside. Pt would benefit from moderate intensity therapy post acute likely SNF.  If pt. goes home from hospital,  HHOT, ARLEEN aide, 24/7 caregiver assist and RW is recommended. Continue with OT POC.     Rehab Prognosis: Fair; patient would benefit from acute skilled OT services to address these deficits and reach maximum level of function.       Plan:     Patient to be seen 3 x/week to address the above listed problems via self-care/home management, therapeutic activities, therapeutic exercises  Plan of Care Expires: 07/01/23  Plan of Care Reviewed with: patient, daughter    Subjective     Chief Complaint: back pain   Patient/Family Comments/goals: Daughter stated pt has had multiple falls at home, found on floor this last fall. She indicated pt will not be returning to pt's home but to a new home just purchased next to daughter's house, home has a ramp.    Occupational Profile:  Living Environment: Pt lives alone [facility] with 3 steps and 1HR; daughter checks on her regularly and has a video camera in the home.  Previous level of function: Indep-Mod I basic self care, ambulated Mod I with SPC; multiple falls at home with injury last week (compression fx thoracic and lumbar ), pt was found on floor after 4 hrs this most recent fall.  Roles and Routines: caretaker of self  Equipment Used at Home: cane, straight, bath bench, rollator, shower chair, bedside commode, wheelchair  Assistance upon Discharge: daughter and other family members per daughter's report.    Pain/Comfort:  Pain Rating 1:  (max pain in back reported with movement, pt did not rate)  Location - Side 1: Bilateral  Location - Orientation 1: generalized  Location 1: back  Pain Addressed 1: Pre-medicate for activity, Reposition, Distraction, Cessation of Activity, Nurse notified  Pain Rating Post-Intervention 1:  (not rated less c/o pain at rest)    Patients cultural, spiritual, Latter day conflicts given the current  situation: no    Objective:     Communicated with: nurse prior to session.  Patient found HOB elevated with bed alarm, telemetry, PureWick, peripheral IV upon OT entry to room.    General Precautions: Standard, fall, hearing impaired  Orthopedic Precautions: spinal precautions (compression fractures not acute in thoracic T3,4,7,11,12 and lumbar L1)  Braces: N/A  Respiratory Status: Room air    Occupational Performance:    Bed Mobility:    Patient completed Rolling/Turning to Left with  minimum assistance, with side rail, and log roll  Patient completed Scooting/Bridging with total assistance  Patient completed Supine to Sit with moderate assistance    Functional Mobility/Transfers:  Patient completed Sit <> Stand Transfer with minimum assistance  with  rolling walker and slow movements, guarded in pain, extra time to transition due to back pain   Patient completed Bed <> Chair Transfer using Step Transfer technique with minimum assistance and extra time to move, slow, short steps, stopping  due to back pain with rolling walker  Functional Mobility: ambulated Jamar with RW, max vc to sequencing, constant stopping to take breaks due to back pian, short steps, unsteady, forward flexed posture, poor self correction    Activities of Daily Living:  Feeding:  minimum assistance to grasp utensil, scoop and tremulous advancement of utensil to mouth  Lower Body Dressing: total assistance socks  Toileting: total assistance PW    Cognitive/Visual Perceptual:  Cognitive/Psychosocial Skills:     -       Oriented to: Person and Place   -       Follows Commands/attention:Follows one-step commands and repeated commands, increased time given to answer but she reported dififculty getting her thoughts together.  -       Communication: clear/fluent  -       Memory: ongoing assessment  -       Safety awareness/insight to disability: impaired   -       Mood/Affect/Coping skills/emotional control: Anxious and Flat affect, uncomfortable and  in pain  Visual/Perceptual:      -Intact .    Physical Exam:  Balance:    -       sitting: fair plus  dynamic: fair   spinal precautions, no bending   standing: fair  dynamic: poor plus  Postural examination/scapula alignment:    -       Rounded shoulders  -       Forward head  -       Abnormal trunk flexion  Dominant hand:    -       right  Upper Extremity Range of Motion:     -       Right Upper Extremity: WFL  -       Left Upper Extremity: WFL  Upper Extremity Strength:    -       Right Upper Extremity: Deficits: 4-/5  -       Left Upper Extremity: Deficits: 4-/5   Strength:    -       Right Upper Extremity: WFL  -       Left Upper Extremity: WFL    AMPAC 6 Click ADL:  AMPAC Total Score: 13    Treatment & Education:  Purpose of OT and POC  Completed family training with daughter in above fx mobility, spinal precautions, log roll (handout issued for log roll tech and car transfer)  All questions/concerns addressed within scope  Call staff for BTB assist  Impt of [facility] craig, pt agreeable to sit UIC to eat lunch.  DC planning, daughter receptive to dc recs: placement for continued therapy vs HH with 24/7.        Patient left up in chair with all lines intact, call button in reach, nurse notified, and daughter/caregiver present    GOALS:   Multidisciplinary Problems       Occupational Therapy Goals          Problem: Occupational Therapy    Goal Priority Disciplines Outcome Interventions   Occupational Therapy Goal     OT, PT/OT Ongoing, Progressing    Description: Goals to be met by: 6/30/2023     Patient will increase functional independence with ADLs by performing:    UE Dressing with Modified [place].  LE Dressing with Stand-by Assistance.  Grooming while seated with Modified [place].  Toileting from bedside commode with Stand-by Assistance for hygiene and clothing management.   Supine to sit with Modified [place].  Toilet transfer to bedside commode with Stand-by Assistance.  Upper extremity  exercise program x10 reps per handout, with supervision.                         History:     Past Medical History:   Diagnosis Date    Frozen shoulder     History of atrial fibrillation     History of torn meniscus of left knee     Hypertension     Osteoporosis          Past Surgical History:   Procedure Laterality Date    ANKLE FRACTURE SURGERY  2009    right    CHOLECYSTECTOMY  2008       Time Tracking:     OT Date of Treatment: 06/01/23  OT Start Time: 1305  OT Stop Time: 1344  OT Total Time (min): 39 min cotx with PT due to complex nature of pt     Billable Minutes:Evaluation 10  Self Care/Home Management 15  Therapeutic Activity 14  Total Time 39    6/1/2023

## 2023-06-02 NOTE — NURSING
Pt arrived to floor for skilled services with admitting diagnosis of Chronic kidney disease, stage 3b via wheelchair. Pt required 2 person assist assistance from wheelchair to bed. Pt is AAOx4, incontinent of B/B. Pt is on a regular diet. Skin assessment done: small scab to left knee. Family informed of arrival. POC reviewed with patient. Pt denies pain at this time and is educated to use call light and not get OOB w/o assistance

## 2023-06-02 NOTE — PLAN OF CARE
RN Case  Order Review      Date:  06/02/2023  Discharging Facility noted: SNF  Isolation needed:    NA  Med List Reconciled?:   Reviewed. Facility to review for acceptance. Changes requested to attending team.    Oxygen:  O2 at 1L per NC    Lines noted: PIV SL'd to be DC'd Prior to DC.      LBM:    06/02/2023  Woundcare/Wound vac:  noted on orders    ABX with end date: NA  Discharging with Line:  NA    Ins auth:   per assigned SW    Intake forms completed:    per assigned SW    No future appointments.     Follow-up Information       Erica Valladares MD Follow up on 6/7/2023.    Specialty: Family Medicine  Why: SCHED PCP w/Dr Erica Valladares on 6/7 at 1:00  Contact information:  [facility] [place] [address]  [phone]                             BP (!) 142/67 (BP Location: Right arm, Patient Position: Lying)   Pulse 75   Temp 99.2 °F (37.3 °C) (Axillary)   Resp 18   Ht 5' (1.524 m)   Wt 45.4 kg (100 lb)   SpO2 (!) 92%   BMI 19.53 kg/m²        Medication List        START taking these medications      cyanocobalamin 1000 MCG tablet  Commonly known as: VITAMIN B-12  Take 1 tablet (1,000 mcg total) by mouth once daily.     gabapentin 100 MG capsule  Commonly known as: NEURONTIN  Take 1 capsule (100 mg total) by mouth every evening.     LIDOcaine 5 %  Commonly known as: LIDODERM  Place 3 patches onto the skin once daily. Remove & Discard patch within 12 hours or as directed by MD            CONTINUE taking these medications      acetaminophen 500 MG tablet  Commonly known as: TYLENOL     cholecalciferol (vitamin D3) 50 mcg (2,000 unit) Cap capsule  Commonly known as: VITAMIN D3     ferrous sulfate Tab tablet  Commonly known as: FEOSOL  Take 1 tablet (1 each total) by mouth daily with breakfast.     ibuprofen 200 MG tablet  Commonly known as: ADVIL,MOTRIN     MULTI VITAMIN ORAL     REFRESH P.M. OPHT     SYSTANE (PF) OPHT            STOP taking these  medications      oxyCODONE-acetaminophen 5-325 mg per tablet  Commonly known as: PERCOCET               Where to Get Your Medications        Information about where to get these medications is not yet available    Ask your nurse or doctor about these medications  cyanocobalamin 1000 MCG tablet  gabapentin 100 MG capsule  LIDOcaine 5 %

## 2023-06-02 NOTE — PT/OT/SLP PROGRESS
Physical Therapy Treatment    Patient Name:  Lacey Mcelroy   MRN:  [id number]    Recommendations:     Discharge Recommendations:  (moderate intensity therapy)  Discharge Equipment Recommendations:  (TBD)  Barriers to discharge:  decreased mobility,strength and endurance    Assessment:     Lacey Mcelroy is a 82 y.o. female admitted with a medical diagnosis of Accident due to mechanical fall without injury.  She presents with the following impairments/functional limitations: weakness, impaired endurance, impaired functional mobility, gait instability, impaired balance, decreased lower extremity function, pain, decreased ROM, impaired coordination, impaired skin,pt with good participation and requires assistance with all mobility and will benefit from continuing PT services upon discharge.    Rehab Prognosis: Good; patient would benefit from acute skilled PT services to address these deficits and reach maximum level of function.    Recent Surgery: * No surgery found *      Plan:     During this hospitalization, patient to be seen 5 x/week to address the identified rehab impairments via gait training, therapeutic activities, therapeutic exercises, neuromuscular re-education and progress toward the following goals:    Plan of Care Expires:  07/01/23    Subjective     Chief Complaint: R hip pain  Patient/Family Comments/goals: pt agreeable to rx.  Pain/Comfort:  Pain Rating 1:  (no rating)  Location - Side 1: Right  Location - Orientation 1: generalized  Location 1: hip (with WB)  Pain Addressed 1: Reposition, Distraction, Cessation of Activity      Objective:     Communicated with nsg prior to session.  Patient found supine with bed alarm, oxygen, PureWick, peripheral IV, telemetry upon PT entry to room.     General Precautions: Standard, fall, hearing impaired  Orthopedic Precautions: spinal precautions  Braces: N/A  Respiratory Status: Nasal cannula, flow 2 L/min     Functional Mobility:  Bed Mobility:     Supine  to Sit: minimum assistance and moderate assistance  Transfers:     Sit to Stand:  minimum assistance with rolling walker  Bed to Chair: minimum assistance, moderate assistance, and increased time with  rolling walker  using  Step Transfer  Balance: fair sitting balance      AM-PAC 6 CLICK MOBILITY  Turning over in bed (including adjusting bedclothes, sheets and blankets)?: 2  Sitting down on and standing up from a chair with arms (e.g., wheelchair, bedside commode, etc.): 3  Moving from lying on back to sitting on the side of the bed?: 2  Moving to and from a bed to a chair (including a wheelchair)?: 2  Need to walk in hospital room?: 2  Climbing 3-5 steps with a railing?: 1  Basic Mobility Total Score: 12       Treatment & Education: pt sat EOB ~12 mins with S,increased time required with all mobility secondary to fear and pain.      Patient left up in chair with all lines intact, call button in reach, and nsg and daughter present..    GOALS: see general POC  Multidisciplinary Problems       Physical Therapy Goals          Problem: Physical Therapy    Goal Priority Disciplines Outcome Goal Variances Interventions   Physical Therapy Goal     PT, PT/OT Ongoing, Progressing     Description: Goals to be met by: 2023     Patient will increase functional independence with mobility by performin. Supine to sit with MInimal Assistance  2. Sit to supine with MInimal Assistance  3. Rolling to Left and Right with Minimal Assistance.  4. Sit to stand transfer with Stand By Assistance  5. Bed to chair transfer with Stand-by Assistance using Rolling Walker  6. Gait  x 50 feet with Contact Guard Assistance using Rolling Walker.                          Time Tracking:     PT Received On: 23  PT Start Time: 956     PT Stop Time: 6  PT Total Time (min): 40 min     Billable Minutes: Therapeutic Activity 40    Treatment Type: Treatment  PT/PTA: PTA     Number of PTA visits since last PT visit: 2023

## 2023-06-02 NOTE — PROGRESS NOTES
[facility] Hospital Medicine Progress Note    Primary Team: [facility] Hospitalist Team A  Attending Physician: Cyndi Arroyo MD  Resident: Harlan  Intern: Felicita    Subjective:      Pt reports that she did not sleep well last evening secondary to pain. On-call provided one time dilaudid dose which resolved pt's pain, but this AM noted to desat and require some O2 supplementation. Denies feeling SOB prior to requiring O2 and notes that there is no difference in how she feels after being placed on it. Denies CP, SOB, abd pain, NVD, fever/chills.      Objective:     Last 24 Hour Vital Signs:  BP  Min: 112/65  Max: 167/7  Temp  Av.2 °F (36.2 °C)  Min: 96.1 °F (35.6 °C)  Max: 98.2 °F (36.8 °C)  Pulse  Av.7  Min: 78  Max: 114  Resp  Av.8  Min: 16  Max: 20  SpO2  Av.5 %  Min: 86 %  Max: 96 %  Height  Av' (152.4 cm)  Min: 5' (152.4 cm)  Max: 5' (152.4 cm)  Weight  Av.4 kg (100 lb)  Min: 45.4 kg (100 lb)  Max: 45.4 kg (100 lb)  I/O last 3 completed shifts:  In: -   Out: 300 [Urine:300]    Physical Examination:  General: Alert, thin-appearing, no acute distress  HENT: Atraumatic, normocephalic, EOMI and PERRLA, no scleral icterus, MMM, no nasal discharge  Neck: Midline trachea, no obvious goiter or masses, - LAD  CV: RRR; no murmurs, rubs, or gallops. Normal s1, s2  Resp: CTAB with normal work of breathing and chest excursion. No rhonchi, rales, or wheezing appreciated, On 2L NC   Abd: Soft, NTND. No organomegaly or masses appreciated upon palpation.   MSK: +2 radial pulses b/l. No edema, cyanosis, or erythema noted on extremities.   Neuro: ANOx3. CNII-XII grossly intact. Normal appearing coordination and sensory function. Hard of hearing  Skin: No rashes or lesions noted.        Laboratory:  Laboratory Data Reviewed: yes  Pertinent Findings:  Pertinent labs mentioned in A&P    Microbiology Data Reviewed: yes  Pertinent Findings:  N/a    Other Results:  EKG (my interpretation): sinus tachycardia, rate  100, normal axis, normal intervals, no acute ischemic changes    Radiology Data Reviewed: yes  Pertinent Findings:    Imaging Results              X-Ray Thoracic Spine AP Lateral (Final result)  Result time 06/01/23 11:47:45      Final result by Jessica Borrego MD (06/01/23 11:47:45)                   Impression:      Multilevel compression fracture similar to CT 05/20/2023.  Note that MRI has greater sensitivity to detect subtle acute fracture especially in patient with decreased osseous density and could be done if clinically warranted.      Electronically signed by: Jessica Borrego MD  Date:    06/01/2023  Time:    11:47               Narrative:    EXAMINATION:  XR THORACIC SPINE AP LATERAL    CLINICAL HISTORY:  back pain;  Wedge compression fracture of unspecified lumbar vertebra, subsequent encounter for fracture with routine healing    TECHNIQUE:  AP and lateral views of the thoracic spine were performed.    COMPARISON:  CT thoracic spine 05/20/2023    FINDINGS:  Decreased mineralization of the osseous structures.  Multiple variable double degree of compression fracture deformity, does not appear significantly changed.  The remainder of the visualized osseous structures and soft tissues demonstrate no acute abnormality.  Atherosclerotic plaque of the aorta.                                       X-Ray Chest 1 View (Final result)  Result time 06/01/23 11:48:31      Final result by Son Orellana MD (06/01/23 11:48:31)                   Impression:      Findings as above.  No significant interval detrimental change in the chest from 05/20/2023.      Electronically signed by: Son Orellana  Date:    06/01/2023  Time:    11:48               Narrative:    EXAMINATION:  XR CHEST 1 VIEW    CLINICAL HISTORY:  chest pain; Wedge compression fracture of unspecified lumbar vertebra, subsequent encounter for fracture with routine healing    TECHNIQUE:  Single frontal view of the chest was  performed.    COMPARISON:  Chest radiograph dated 05/20/2023    FINDINGS:  Cardiomediastinal silhouette unchanged.  Lungs appear similar without new confluent opacity or lobar consolidation.  No pleural effusion or gross pneumothorax.  Multilevel thoracic compression deformities.                                       X-Ray Hip 2 or 3 views Right (with Pelvis when performed) (Final result)  Result time 06/01/23 11:44:35      Final result by Jessica Borrego MD (06/01/23 11:44:35)                   Impression:      No acute process seen.  If high degree of suspicion for a nondisplaced fracture consider CT or MRI examination which has greater sensitivity to detect subtle fracture in patient with decreased osseous density.      Electronically signed by: Jessica Borrego MD  Date:    06/01/2023  Time:    11:44               Narrative:    EXAMINATION:  XR HIP WITH PELVIS WHEN PERFORMED, 2 OR 3  VIEWS RIGHT    CLINICAL HISTORY:  right hip pain; Other specified personal risk factors, not elsewhere classified    TECHNIQUE:  AP view of the pelvis and frog leg lateral view of the right hip were performed.    COMPARISON:  05/31/2023 AP pelvis    FINDINGS:  Decreased osseous density.    The right hip joint space appears well preserved.    The right femoral head contour is normal.    No fracture, no osseous lesions.    The acetabular region appears normal, no sclerosis or osteophyte formation.    The remainder of the visualized osseous structures and soft tissues appear within normal limits.                                       X-Ray Femur 2 View Left (Final result)  Result time 06/01/23 11:43:10      Final result by Jessica Borrego MD (06/01/23 11:43:10)                   Impression:      No definite acute process seen.      Electronically signed by: Jessica Borrego MD  Date:    06/01/2023  Time:    11:43               Narrative:    EXAMINATION:  XR FEMUR 2 VIEW LEFT    CLINICAL HISTORY:  Left LE pain;Other  specified personal risk factors, not elsewhere classified    TECHNIQUE:  AP and lateral views of the left femur were performed.    COMPARISON:  None}    FINDINGS:  Mild left hip joint space narrowing.  Decreased mineralization.  No fracture, no malignant osseous lesions, no abnormal periosteal reaction.  Punctate area of sclerosis at the distal femur diaphysis can be seen with enchondroma.                                        Current Medications:     Infusions:       Scheduled:   enoxparin  30 mg Subcutaneous Daily    LIDOcaine  3 patch Transdermal Q24H        PRN:  acetaminophen, dextrose 10%, dextrose 10%, dextrose, dextrose, glucagon (human recombinant), naloxone, oxyCODONE, sodium chloride 0.9%      Assessment:     Lacey Mcelroy is a 82 y.o. female with a PMHx HTN, osteoporosis, Chronic left shoulder pain, CKD3b who presented to the ED on 5/31/23 for a mechanical fall landing on her right side.  Imaging for acute fractures, dislocations, or intracranial bleed negative.  Old fractures of thoracolumbar spine noted.  Attempted discharge from the ED but patient returned to the hospital as her daughter was unable to transport patient out of car due to back pain.  She is admitted to medicine for pain management and safe dispo.       Plan:     Mechanical Fall  -patient tripped over her cane and landed on her right side; she was down for about 4 hours  -CT head and c-spine negative  -x rays of RUE and RLE negative for acute fractures or dislocations  -x ray lumbar spine showed old fractures from prior falls  -x ray thoracic spine, chest, and LLE negative for acute fx, shows chronic thoracic compressions  -lidocaine patch, PRN tylenol, PRN oxycodone, and PRN NSAIDS for RLE, right hip, and lower back pain (caution with opioids 2/2 patient sensitivity; caution with NSAIDs 2/2 hx CKD)  -fall precautions  -PT/OT recommending moderate intensity therapy, like SNF, and rolling walker  - pending placement with CM    Acute  Hypoxic respiratory failure  - pt required 2L NC in last night AM on 6/1 after desatting to 86% on RA  - continuous pulse ox ordered   - afebrile, benign lung exam unlikely to be infectious pulmonary process   - likely due to atelectasis and splinting from pain, satting 94% on 1L NC  - ordered incentive spirometer    Hx Syncopal episodes  -patient reports history of many episodes of loss of consciousness preceded by dizziness; last fall was not due to syncope per patient  -holter monitor in 2015 without rhythms that would cause cardiogenic syncope  -Last echo in 2021 with LVEF 60-65%, diastolic dysfunction, mildly calcified aortic valve, mild MR  -repeat Echo shows LVEF 75%, mild mitral regurg/pulmonic regurg/aortic stenosis  -orthostatic vitals, negative     Hypokalemia  -potassium 3.0 on admission  -replete electrolytes PRN     Normocytic anemia  -Hgb 10.8, MCV 89  -Hx CKD per chart review  -GFR >60 this admission  -iron panel shows iron 13, TIBC 339, ferritin 397, consistent with AoCI v  - B12 244 and folate 6.1, will supplement while inpatient     CKD stage 3b  -per chart review and on prior labs  -GFR > 60 this admission  -cautious with NSAIDS; however patient is sensitive to opioids so tylenol and NSAIDS may be necessary as long as kidney function remains adequate     Essential Hypertension  -previous diagnosis but no longer on medication and normotensive on admission     Osteoporosis  -high risk for fractures; x rays negative for acute fractures this admission, but evidence of remote fractures in thoracic and lumbar spine noted  -continue Vitamin D supplementation  -fall precautions        Diet: Regular adult  DVT ppx: Lovenox  Code status: Full    Dispo: discharge today to SNF  ** pt medically cleared and stable for discharge to SNF for rehab **      Carissa Mims MD  \[facility]\"" Internal Medicine HO-I    \[facility]\"" Medicine Hospitalist Pager numbers:   \[facility]\"" Hospitalist Medicine Team A (Nicolette/Dina): [phone]  \[facility]\""  Hospitalist Medicine Team B (Nadine/Sherley):  [phone]

## 2023-06-02 NOTE — DISCHARGE SUMMARY
[facility] Hospital Medicine Discharge Summary    Primary Team: [facility] Hospitalist Team A  Attending Physician: Cyndi Arroyo MD  Resident: Harlan  Intern: Felicita    Date of Admit: 6/1/2023  Date of Discharge: 6/2/2023    Discharge to: SNF  Condition: stable    Discharge Diagnoses     Patient Active Problem List   Diagnosis    Essential hypertension    History of torn meniscus of left knee    History of atrial fibrillation    Osteoporosis    Anxiety    Mitral prolapse    Vasovagal syncope    Cataract, right    Anemia in stage 3 chronic kidney disease    Mitral regurgitation    Compression fracture of lumbar vertebra    Stage 3b chronic kidney disease    Accident due to mechanical fall without injury    Physical deconditioning    Hypokalemia    Hypomagnesemia    Hypoxia       Consultants and Procedures     Consultants:  N/a    Procedures:   N/a    Imaging:  Imaging Results              X-Ray Thoracic Spine AP Lateral (Final result)  Result time 06/01/23 11:47:45      Final result by Jessica Borrego MD (06/01/23 11:47:45)                   Impression:      Multilevel compression fracture similar to CT 05/20/2023.  Note that MRI has greater sensitivity to detect subtle acute fracture especially in patient with decreased osseous density and could be done if clinically warranted.      Electronically signed by: Jessica Borrego MD  Date:    06/01/2023  Time:    11:47               Narrative:    EXAMINATION:  XR THORACIC SPINE AP LATERAL    CLINICAL HISTORY:  back pain;  Wedge compression fracture of unspecified lumbar vertebra, subsequent encounter for fracture with routine healing    TECHNIQUE:  AP and lateral views of the thoracic spine were performed.    COMPARISON:  CT thoracic spine 05/20/2023    FINDINGS:  Decreased mineralization of the osseous structures.  Multiple variable double degree of compression fracture deformity, does not appear significantly changed.  The remainder of the visualized osseous  structures and soft tissues demonstrate no acute abnormality.  Atherosclerotic plaque of the aorta.                                       X-Ray Chest 1 View (Final result)  Result time 06/01/23 11:48:31      Final result by Son Orellana MD (06/01/23 11:48:31)                   Impression:      Findings as above.  No significant interval detrimental change in the chest from 05/20/2023.      Electronically signed by: Son Orellana  Date:    06/01/2023  Time:    11:48               Narrative:    EXAMINATION:  XR CHEST 1 VIEW    CLINICAL HISTORY:  chest pain; Wedge compression fracture of unspecified lumbar vertebra, subsequent encounter for fracture with routine healing    TECHNIQUE:  Single frontal view of the chest was performed.    COMPARISON:  Chest radiograph dated 05/20/2023    FINDINGS:  Cardiomediastinal silhouette unchanged.  Lungs appear similar without new confluent opacity or lobar consolidation.  No pleural effusion or gross pneumothorax.  Multilevel thoracic compression deformities.                                       X-Ray Hip 2 or 3 views Right (with Pelvis when performed) (Final result)  Result time 06/01/23 11:44:35      Final result by Jessica Borrego MD (06/01/23 11:44:35)                   Impression:      No acute process seen.  If high degree of suspicion for a nondisplaced fracture consider CT or MRI examination which has greater sensitivity to detect subtle fracture in patient with decreased osseous density.      Electronically signed by: Jessica Borrego MD  Date:    06/01/2023  Time:    11:44               Narrative:    EXAMINATION:  XR HIP WITH PELVIS WHEN PERFORMED, 2 OR 3  VIEWS RIGHT    CLINICAL HISTORY:  right hip pain; Other specified personal risk factors, not elsewhere classified    TECHNIQUE:  AP view of the pelvis and frog leg lateral view of the right hip were performed.    COMPARISON:  05/31/2023 AP pelvis    FINDINGS:  Decreased osseous density.    The right  hip joint space appears well preserved.    The right femoral head contour is normal.    No fracture, no osseous lesions.    The acetabular region appears normal, no sclerosis or osteophyte formation.    The remainder of the visualized osseous structures and soft tissues appear within normal limits.                                       X-Ray Femur 2 View Left (Final result)  Result time 06/01/23 11:43:10      Final result by Jessica Borrego MD (06/01/23 11:43:10)                   Impression:      No definite acute process seen.      Electronically signed by: Jessica Borrego MD  Date:    06/01/2023  Time:    11:43               Narrative:    EXAMINATION:  XR FEMUR 2 VIEW LEFT    CLINICAL HISTORY:  Left LE pain;Other specified personal risk factors, not elsewhere classified    TECHNIQUE:  AP and lateral views of the left femur were performed.    COMPARISON:  None}    FINDINGS:  Mild left hip joint space narrowing.  Decreased mineralization.  No fracture, no malignant osseous lesions, no abnormal periosteal reaction.  Punctate area of sclerosis at the distal femur diaphysis can be seen with enchondroma.                                        Brief History of Present Illness      Lacey Mcelroy is a 82 y.o. female with a PMHx  HTN, osteoporosis, Chronic left shoulder pain, CKD3b who presented to the ED on 5/31/23 for a mechanical fall.  She reports tripping on her cane at around noon the day of presentation.  She reports landing on her right side.  She denies any loss of consciousness or dizziness prior to the fall.  The patient lives at home alone and the daughter checked in on the home video cameras and noticed that the patient wasn't on the couch like she normally would be. She called the patient and who then told her that she had fell on the ground.  The daughter then called EMS who took her to the ED.  The patient's down time was about 4 hours.  She complained of pain in her right hip, right elbow,  lower back, and left leg.  X rays taken in the ED were negative for any acute fractures or dislocations but noted old fractures from previous falls in the thoracic and lumber spine.  CT Head and c-spine were negative.  The patient was discharged from the ED with plans for the daughter to help her back home and stay the night with her.  When they arrived at the patient's home, the daughter was unable to help the patient out of the car because she was having too much back and right hip pain with trying to stand.  She then decided to return to the ED.  The patient says she has had multiple falls in the past and attributes those falls to feeling dizzy and passing out.  That was not the case with this last fall.  She denied any chest pain, shortness of breath, current lightheadness or dizziness, abdominal pain, headache, confusion, change in vision, or other issues at this time.    Imaging negative for any acute fractures or findings, showing chronic thoracic vertebral compression fractures. Provided pain control, PT/OT while inpatient. Pt developed small oxygen requirement likely from being on the floor for several hours and atelectasis, treated with 1L NC and incentive spirometry. Pt medically stable for SNF placement to work on debility and deconditioning.     For the full HPI please refer to the History & Physical from this admission.    Hospital Course By Problem with Pertinent Findings     Mechanical Fall  -patient tripped over her cane and landed on her right side; she was down for about 4 hours  -CT head and c-spine negative  -x rays of RUE and RLE negative for acute fractures or dislocations  -x ray lumbar spine showed old fractures from prior falls  -x ray thoracic spine, chest, and LLE negative for acute fx, shows chronic thoracic compressions  -lidocaine patch, PRN tylenol, PRN oxycodone, and PRN NSAIDS for RLE, right hip, and lower back pain (caution with opioids 2/2 patient sensitivity; caution with NSAIDs  2/2 hx CKD)  -fall precautions  -PT/OT recommending moderate intensity therapy, like SNF, and rolling walker     Acute Hypoxic respiratory failure  - pt required 2L NC in last night AM on 6/1 after desatting to 86% on RA  - continuous pulse ox ordered   - afebrile, benign lung exam unlikely to be infectious pulmonary process   - likely due to atelectasis and splinting from pain, satting 94% on 1L NC  - ordered incentive spirometer     Hx Syncopal episodes  -patient reports history of many episodes of loss of consciousness preceded by dizziness; last fall was not due to syncope per patient  -holter monitor in 2015 without rhythms that would cause cardiogenic syncope  -Last echo in 2021 with LVEF 60-65%, diastolic dysfunction, mildly calcified aortic valve, mild MR  -repeat Echo shows LVEF 75%, mild mitral regurg/pulmonic regurg/aortic stenosis  -orthostatic vitals, negative     Hypokalemia  -potassium 3.0 on admission  -replete electrolytes PRN     Normocytic anemia  -Hgb 10.8, MCV 89  -Hx CKD per chart review  -GFR >60 this admission  -iron panel shows iron 13, TIBC 339, ferritin 397, consistent with AoCI   - B12 244 and folate 6.1, will supplement while inpatient     CKD stage 3b  -per chart review and on prior labs  -GFR > 60 this admission  -cautious with NSAIDS; however patient is sensitive to opioids so tylenol and NSAIDS may be necessary as long as kidney function remains adequate     Essential Hypertension  -previous diagnosis but no longer on medication and normotensive on admission     Osteoporosis  -high risk for fractures; x rays negative for acute fractures this admission, but evidence of remote fractures in thoracic and lumbar spine noted  -continue Vitamin D supplementation and follow up with PCP for further monitoring  -fall precautions      Discharge Medications        Medication List        START taking these medications      cyanocobalamin 1000 MCG tablet  Commonly known as: VITAMIN B-12  Take 1  tablet (1,000 mcg total) by mouth once daily.     gabapentin 100 MG capsule  Commonly known as: NEURONTIN  Take 1 capsule (100 mg total) by mouth every evening.     LIDOcaine 5 %  Commonly known as: LIDODERM  Place 3 patches onto the skin once daily. Remove & Discard patch within 12 hours or as directed by MD            CONTINUE taking these medications      acetaminophen 500 MG tablet  Commonly known as: TYLENOL     cholecalciferol (vitamin D3) 50 mcg (2,000 unit) Cap capsule  Commonly known as: VITAMIN D3     ferrous sulfate Tab tablet  Commonly known as: FEOSOL  Take 1 tablet (1 each total) by mouth daily with breakfast.     ibuprofen 200 MG tablet  Commonly known as: ADVIL,MOTRIN     MULTI VITAMIN ORAL     REFRESH P.M. OPHT     SYSTANE (PF) OPHT            STOP taking these medications      oxyCODONE-acetaminophen 5-325 mg per tablet  Commonly known as: PERCOCET               Where to Get Your Medications        Information about where to get these medications is not yet available    Ask your nurse or doctor about these medications  cyanocobalamin 1000 MCG tablet  gabapentin 100 MG capsule  LIDOcaine 5 %         Discharge Information:   Diet:  Regular diet    Physical Activity:  Up with assistance, as tolerated             Instructions:  1. Take all medications as prescribed  2. Keep all follow-up appointments  3. Return to the hospital or call your primary care physicians if any worsening symptoms such as fever, chest pain, shortness of breath, return of symptoms, or any other concerns.    Follow-Up Appointments:   Follow-up Information       Erica Valladares MD Follow up on 6/7/2023.    Specialty: Family Medicine  Why: SCHED PCP w/Dr Erica Valladares on 6/7 at 1:00  Contact information:  [address] [place] [id number]  [phone]                               Carissa Mims MD  [facility], [facility] [place]

## 2023-06-02 NOTE — PLAN OF CARE
Problem: Physical Therapy  Goal: Physical Therapy Goal  Description: Goals to be met by: 2023     Patient will increase functional independence with mobility by performin. Supine to sit with MInimal Assistance  2. Sit to supine with MInimal Assistance  3. Rolling to Left and Right with Minimal Assistance.  4. Sit to stand transfer with Stand By Assistance  5. Bed to chair transfer with Stand-by Assistance using Rolling Walker  6. Gait  x 50 feet with Contact Guard Assistance using Rolling Walker.     Outcome: Ongoing, Progressing

## 2023-06-02 NOTE — PLAN OF CARE
Problem: Adult Inpatient Plan of Care  Goal: Patient-Specific Goal (Individualized)  Outcome: Ongoing, Progressing     Problem: Fall Injury Risk  Goal: Absence of Fall and Fall-Related Injury  Outcome: Ongoing, Progressing     Problem: Pain Chronic (Persistent) (Comorbidity Management)  Goal: Acceptable Pain Control and Functional Ability  Outcome: Ongoing, Progressing

## 2023-06-03 PROBLEM — M54.50 ACUTE LOW BACK PAIN: Status: ACTIVE | Noted: 2023-06-03

## 2023-06-03 LAB
BILIRUB UR QL STRIP: NEGATIVE
CLARITY UR REFRACT.AUTO: CLEAR
COLOR UR AUTO: YELLOW
GLUCOSE UR QL STRIP: NEGATIVE
HGB UR QL STRIP: NEGATIVE
KETONES UR QL STRIP: NEGATIVE
LEUKOCYTE ESTERASE UR QL STRIP: NEGATIVE
NITRITE UR QL STRIP: NEGATIVE
PH UR STRIP: 5 [PH] (ref 5–8)
PROT UR QL STRIP: NEGATIVE
SP GR UR STRIP: 1.01 (ref 1–1.03)
URN SPEC COLLECT METH UR: NORMAL

## 2023-06-03 PROCEDURE — 94761 N-INVAS EAR/PLS OXIMETRY MLT: CPT

## 2023-06-03 PROCEDURE — 97165 OT EVAL LOW COMPLEX 30 MIN: CPT

## 2023-06-03 PROCEDURE — 27000221 HC OXYGEN, UP TO 24 HOURS

## 2023-06-03 PROCEDURE — 81003 URINALYSIS AUTO W/O SCOPE: CPT | Performed by: FAMILY MEDICINE

## 2023-06-03 PROCEDURE — 99900035 HC TECH TIME PER 15 MIN (STAT)

## 2023-06-03 PROCEDURE — 11000004 HC SNF PRIVATE

## 2023-06-03 PROCEDURE — 25000003 PHARM REV CODE 250: Performed by: FAMILY MEDICINE

## 2023-06-03 PROCEDURE — 97535 SELF CARE MNGMENT TRAINING: CPT

## 2023-06-03 PROCEDURE — 25000003 PHARM REV CODE 250: Performed by: HOSPITALIST

## 2023-06-03 RX ORDER — HYDROCODONE BITARTRATE AND ACETAMINOPHEN 5; 325 MG/1; MG/1
1 TABLET ORAL 2 TIMES DAILY PRN
Status: DISCONTINUED | OUTPATIENT
Start: 2023-06-03 | End: 2023-06-05 | Stop reason: HOSPADM

## 2023-06-03 RX ORDER — IBUPROFEN 200 MG
400 TABLET ORAL EVERY 8 HOURS PRN
Status: DISCONTINUED | OUTPATIENT
Start: 2023-06-03 | End: 2023-06-03

## 2023-06-03 RX ORDER — ACETAMINOPHEN 500 MG
1000 TABLET ORAL EVERY 8 HOURS
Status: DISCONTINUED | OUTPATIENT
Start: 2023-06-03 | End: 2023-06-05 | Stop reason: HOSPADM

## 2023-06-03 RX ORDER — IBUPROFEN 200 MG
200 TABLET ORAL EVERY 8 HOURS PRN
Status: DISCONTINUED | OUTPATIENT
Start: 2023-06-03 | End: 2023-06-05 | Stop reason: HOSPADM

## 2023-06-03 RX ORDER — ASCORBIC ACID 500 MG
500 TABLET ORAL 2 TIMES DAILY
Status: DISCONTINUED | OUTPATIENT
Start: 2023-06-03 | End: 2023-06-05 | Stop reason: HOSPADM

## 2023-06-03 RX ORDER — LANOLIN ALCOHOL/MO/W.PET/CERES
1 CREAM (GRAM) TOPICAL EVERY OTHER DAY
Status: DISCONTINUED | OUTPATIENT
Start: 2023-06-04 | End: 2023-06-05 | Stop reason: HOSPADM

## 2023-06-03 RX ORDER — CELECOXIB 100 MG/1
100 CAPSULE ORAL DAILY
Status: DISCONTINUED | OUTPATIENT
Start: 2023-06-03 | End: 2023-06-05 | Stop reason: HOSPADM

## 2023-06-03 RX ADMIN — THERA TABS 1 TABLET: TAB at 08:06

## 2023-06-03 RX ADMIN — OXYCODONE HYDROCHLORIDE AND ACETAMINOPHEN 500 MG: 500 TABLET ORAL at 10:06

## 2023-06-03 RX ADMIN — ACETAMINOPHEN 1000 MG: 500 TABLET ORAL at 10:06

## 2023-06-03 RX ADMIN — OXYCODONE HYDROCHLORIDE AND ACETAMINOPHEN 500 MG: 500 TABLET ORAL at 12:06

## 2023-06-03 RX ADMIN — LIDOCAINE 3 PATCH: 50 PATCH TOPICAL at 04:06

## 2023-06-03 RX ADMIN — ACETAMINOPHEN 1000 MG: 500 TABLET ORAL at 04:06

## 2023-06-03 RX ADMIN — SENNOSIDES AND DOCUSATE SODIUM 1 TABLET: 50; 8.6 TABLET ORAL at 08:06

## 2023-06-03 RX ADMIN — IBUPROFEN 200 MG: 200 TABLET, FILM COATED ORAL at 12:06

## 2023-06-03 RX ADMIN — CHOLECALCIFEROL TAB 25 MCG (1000 UNIT) 2000 UNITS: 25 TAB at 08:06

## 2023-06-03 RX ADMIN — SENNOSIDES AND DOCUSATE SODIUM 1 TABLET: 50; 8.6 TABLET ORAL at 10:06

## 2023-06-03 RX ADMIN — CELECOXIB 100 MG: 100 CAPSULE ORAL at 12:06

## 2023-06-03 RX ADMIN — FERROUS SULFATE TAB 325 MG (65 MG ELEMENTAL FE) 1 EACH: 325 (65 FE) TAB at 08:06

## 2023-06-03 RX ADMIN — ACETAMINOPHEN 500 MG: 500 TABLET ORAL at 08:06

## 2023-06-03 RX ADMIN — CYANOCOBALAMIN TAB 1000 MCG 1000 MCG: 1000 TAB at 08:06

## 2023-06-03 NOTE — PROGRESS NOTES
"                                                        [facility]                                  Skilled Nursing Facility                   Progress Note     Patient Name: Lacey Mcelroy  YOB: 1940  MRN: [id number]  Room: [id number]/[id number] A     Admit Date: 6/2/2023   ANIBAL:      Principal Problem: deconditioning    HPI obtained from patient interview and chart review     Chief Complaint:   Establish Care/ Initial Visit, dysuria, lethargy, pain control      HPI:   Lacey Mcelroy is a 82 y.o. female with a PMHx  HTN, syncope, osteoporosis, compression fractures, chronic left shoulder pain, CKD3b s/p mechanical fall. Cardiac causes ruled out. She complained of pain in her right hip, right elbow, lower back, and left leg.  X rays taken in the ED were negative for any acute fractures or dislocations but noted old fractures from previous falls in the thoracic and lumber spine.  CT Head and c-spine were negative. Discharged from ED but daughter was unable to transfer her from car into the home and returned to ED. Repeat XR negative for acute fractures. PT/OT and cleared for discharge to SNF for further therapy.    Patient will be treated at [facility] with PT and OT to improve functional status and ability to perform ADLs.     Interval History  All of today's labs reviewed and are listed below.  24 hr vital sign ranges listed below.  Patient denies shortness of breath, abdominal discomfort, nausea, or vomiting.  Patient reports an adequate appetite but does not like the food here.  Patient's daughter reports complaints of dysuria since admit.  Pain regimen discussed at length, sensitive to opioids and increased lethargy with decrease in O2 sat after receiving dilaudid prior to admit. Oxycodone "too strong" per daughter. Has used APAP and ibuprofen for years for joint pain with good result. Cr 1.0 will continue home dose 200mg q8h PRN breakthrough pain and scheduled APAP 1000mg q8h. " Change oxy to Norco PRN BID for severe pain unrelieved by other meds. Initiate low dose celebrex daily. UA C&S for dysuria and tea-colored urine. Initiate Vitamin C to acidify urine. Daughter with increased anxiety and hypervigilance regarding fractures and previous imaging results. Continuing to follow and treat all acute and chronic conditions.    Past Medical History: Patient has a past medical history of Frozen shoulder, History of atrial fibrillation, History of torn meniscus of left knee, Hypertension, and Osteoporosis.    Past Surgical History: Patient has a past surgical history that includes Cholecystectomy (2008) and Ankle fracture surgery (2009).    Social History: Patient reports that she has never smoked. She has never used smokeless tobacco. She reports that she does not drink alcohol.    Family History:  family history includes Diabetes type II in her sister; Heart disease in her brother, father, and sister; Hypertension in her brother, father, sister, and sister; Peripheral vascular disease in her brother; Rheumatic fever in her sister.    Allergies: Patient is allergic to darvocet a500 [propoxyphene n-acetaminophen], lortab [hydrocodone-acetaminophen], and tramadol.    ROS    Constitutional:  Negative for fever.   HENT:  Negative for sore throat.    Eyes:  Negative for redness.   Respiratory:  Negative for shortness of breath.    Cardiovascular:  Negative for chest pain.   Gastrointestinal:  Negative for nausea.   Genitourinary:  Positive for dysuria.   Musculoskeletal: Positive for back pain with movement only.   Skin:  Negative for rash.   Neurological:  Positive for weakness.   Hematological:  Does not bruise/bleed easily.   Psychiatric/Behavioral:  Denies being nervous/anxious.     24 hour Vital Sign Range   Temp:  [98 °F (36.7 °C)-99.8 °F (37.7 °C)]   Pulse:  [75-97]   Resp:  [17-20]   BP: (104-157)/(57-68)   SpO2:  [97 %-100 %]       Physical Exam  General: Lethargic, dozing off while sitting  up, frail, elderly  HENT: Atraumatic, normocephalic, EOMI and PERRLA, no scleral icterus, MMM, no nasal discharge. Hard of hearing  Neck: Midline trachea, no obvious goiter or masses  CV: RRR; no murmurs, rubs, or gallops. Normal s1, s2  Resp: CTAB with normal work of breathing and chest excursion. No rhonchi, rales, or wheezing appreciated, On 2L NC   Abd: Soft, NTND. No organomegaly or masses appreciated upon palpation.   MSK: +2 radial pulses b/l. No edema, cyanosis, or erythema noted on extremities. +kyphosis  Neuro: Oriented x3. CNII-XII grossly intact. Normal appearing coordination and sensory function.   Skin: No rashes or lesions noted.   Psych: Irritable, calm    Labs:  Recent Labs   Lab 05/31/23 1851 06/01/23 0510 06/02/23 0447   WBC 10.29 9.04 6.25   HGB 10.8* 10.1* 9.7*   HCT 32.4* 30.6* 29.1*    134* 119*     Recent Labs   Lab 05/31/23 1851 06/01/23 0510 06/02/23 0447    136 138   K 3.0* 4.2 3.5    103 105   CO2 22* 21* 22*   BUN 26* 30* 25*   CREATININE 0.9 1.1 1.0   * 121* 99   CALCIUM 9.3 8.9 8.8   MG  --  1.3* 2.2   PHOS  --  3.4 3.0     Recent Labs   Lab 05/31/23 1851 06/02/23 0447   ALKPHOS 88 84   ALT 16 16   AST 16 23   ALBUMIN 3.5 3.0*   PROT 6.6 6.0   BILITOT 0.5 0.7     No results for input(s): POCTGLUCOSE in the last 72 hours.    Meds Scheduled:   acetaminophen  1,000 mg Oral Q8H    ascorbic acid (vitamin C)  500 mg Oral BID    celecoxib  100 mg Oral Daily    cyanocobalamin  1,000 mcg Oral Daily    [START ON 6/4/2023] ferrous sulfate  1 tablet Oral Every other day    LIDOcaine  3 patch Transdermal Q24H    multivitamin  1 tablet Oral Daily    senna-docusate 8.6-50 mg  1 tablet Oral BID    vitamin D  2,000 Units Oral Daily    white petrolatum-mineral oiL   Both Eyes QHS       PRN:   calcium carbonate, HYDROcodone-acetaminophen, ibuprofen, melatonin      Assessment and Plan:    Mechanical Fall  Hx Syncopal episodes  Hx multiple falls  Neuropathic pain  Imaging  no acute fractures, multiple compression fractures of indeterminate age  Cardiac causes ruled out, tripped over cane at home  Opioid naive, required O2 after receiving dilaudid 86% sat  Tylenol, ibuprofen effective at home for years  6/3/2023  Continue home dose ibuprofen 200mg q8h PRN breakthrough pain and scheduled APAP 1000mg q8h. Change oxy to Norco PRN BID for severe pain unrelieved by other meds. Continue lidoderm.  Initiate low dose celebrex daily.  Fall precautions    Confusion  Lethargy  6/3/2023  Avoid opioids  UA C&S ordered     Normocytic anemia  Hgb 9.7 on admit  Iron panel shows iron 13, TIBC 339, ferritin 397, consistent with AoCI   B12 244 and folate 6.1, continue PO supplement     CKD stage 3b  Monitor kidney function on routine labs  Long term NSAID use, ok to continue based on admit labs     Osteoporosis  Refused outpatient medication/treatment previously  High risk for fractures; x rays negative for acute fractures this admission, but evidence of remote fractures in thoracic and lumbar spine noted  Continue Vitamin D supplementation and follow up with PCP   Fall precautions     Anticipate disposition:    Home with home health, lives next to her daughter      Follow-up needed during SNF admission:   none    Follow-up needed after discharge from SNF:   - PCP within 1-2 weeks  - See appt scheduled below   -Dr. Vázquez orthopedics if desired    No future appointments.      I certify that SNF services are required to be given on an inpatient basis because Lacey Mcelroy needs for skilled nursing care and/or skilled rehabilitation are required on a daily basis and such services can only practically be provided in a skilled nursing facility setting and are for an ongoing condition for which she received inpatient care in the hospital.       Extended Visit:   Total time spent: 125 minutes  Non Face to Face Time: 100 minutes   Description of Time: counseling provided on clinical condition, therapies  provided, plan of care, emotional support, coordinating patient care with other care team members, reviewing and interpreting labs and imaging, collaboration with physician, initiating new orders, chart review, and documentation. See interval hx.         CANDIE NOONAN  Department of Hospital Medicine   [facility]     DOS: 6/3/2023       Patient note was created using MModal Dictation.  Any errors in syntax or even information may not have been identified and edited on initial review prior to signing this note.

## 2023-06-03 NOTE — PT/OT/SLP EVAL
Occupational Therapy   Evaluation    Name: Lacey Mcelroy  MRN: [id number]  Admit Date: 6/2/2023  Recent Surgeries: n/a     General Precautions: Standard, hearing impaired, fall  Orthopedic Precautions:spinal precautions   Braces: N/A    Recommendations:     Discharge Recommendations: home health OT  Level of Assistance Recommended: 24 hours significant assistance  Discharge Equipment Recommendations:  hip kit, walker, rolling  Barriers to discharge:  None    Assessment:     Lacey Mcelroy is a 82 y.o. female with a medical diagnosis of <principal problem not specified>.  She presents with the following performance deficits affecting function: weakness, impaired endurance, impaired sensation, impaired self care skills, impaired functional mobility, gait instability, impaired balance, decreased coordination, decreased upper extremity function, decreased lower extremity function, decreased safety awareness, pain, impaired coordination, impaired skin, orthopedic precautions.  Pt was agreeable to and participated in OT evaluation.  Pt lives alone and reports being mod I with all mobility and ADLS at [facility] using DME.  Pt currently noted with spinal precautions due to multiple compression fx on spine with increased pain with movement.  Pt currently requires set up to max A with ADLS and min-mod A with func mobility, along with increased time to complete tasks. Goals established to assist pt with returning to [facility] regarding ADLs and func mobility.  Pt will benefit from skilled OT services in order to increase her level of safety and independence with ADLs and mobility.        Rehab Potential is good    Activity Tolerance: Fair    Plan:     Patient to be seen 5 x/week to address the above listed problems via self-care/home management, therapeutic activities, therapeutic exercises  Plan of Care Expires: 07/03/23  Plan of Care Reviewed with: patient, daughter    Subjective     Chief Complaint: pain in lower back/R hip with  "movement and when standing  Patient/Family Comments/goals: "I don't want anyone babying me"    Occupational Profile:  Living Environment: pt lives alone in [facility] but will be moving to new home next door to daughter with ramp access - will have step in shower  Previous level of function: mod I using DME with mobility and ADLs  Roles and Routines: mother, grandmother  Equipment Used at Home: bath bench, cane, straight, rollator, bedside commode, wheelchair  Assistance upon Discharge: assistance as needed from family    Pain/Comfort:  Pain Rating 1: 0/10 (pain during movement and standing only)  Location - Side 1: Right  Location - Orientation 1: lower  Location 1:  (back/hip)  Pain Addressed 1: Pre-medicate for activity, Reposition, Distraction, Cessation of Activity, Other (see comments) (ice pack applied)  Pain Rating Post-Intervention 1: 0/10    Patients cultural, spiritual, [employer] conflicts given the current situation: no    Objective:     Communicated with: nurse prior to session.  Patient found HOB elevated with oxygen, PureWick upon OT entry to room.    Occupational Performance:      06/03/23 1055   Eating   Assistance Needed Set-up / clean-up   CARE Score - Eating 5   Oral Hygiene   Assistance Needed Set-up / clean-up   Comment set up A for grooming while seated in w/c at sink- increased time to complete tasks of combing hair, brushing teeth and washing face   CARE Score - Oral Hygiene 5   Toileting Hygiene   Assistance Needed Physical assistance   Physical Assistance Level 51%-75%   Comment Max A with toileting - needed A with hyginene and clothign mgmt while holding onto RW   CARE Score - Toileting Hygiene 2   Shower/Bathe Self   Assistance Needed Physical assistance   Physical Assistance Level 26%-50%   Comment Mod A with sponge bath - needed A with B LEs and buttocks - set up for rest - sponge bath at sink while seated   CARE Score - Shower/Bathe Self 3   Upper Body Dressing   Assistance Needed Physical " assistance   Physical Assistance Level 25% or less   Comment Min A to nancy/doff pull over style top while seated   CARE Score - Upper Body Dressing 3   Lower Body Dressing   Assistance Needed Physical assistance   Physical Assistance Level 76% or more   Comment Max A to nancy underwear and pants   CARE Score - Lower Body Dressing 2   Putting On/Taking Off Footwear   Assistance Needed Physical assistance   Physical Assistance Level Total assistance   Comment total A to nancy/doff socks   CARE Score - Putting On/Taking Off Footwear 1   Roll Left and Right   Assistance Needed Physical assistance   Physical Assistance Level 26%-50%   Comment Mod A and increased time with cues for log roll   CARE Score - Roll Left and Right 3   Sit to Lying   Assistance Needed Physical assistance   Physical Assistance Level 51%-75%   Comment Max A   CARE Score - Sit to Lying 2   Sit to Stand   Assistance Needed Physical assistance   Physical Assistance Level 26%-50%   Comment Pt stood numerous times during eval - required min-mod A using RW with increased time and cues   CARE Score - Sit to Stand 3   Toilet Transfer   Assistance Needed Physical assistance   Physical Assistance Level 26%-50%   Comment BSC - Mod A to sit on BSC and min A to get off   CARE Score - Toilet Transfer 3     Cognitive/Visual Perceptual:  Cognitive/Psychosocial Skills:   Daughters are present -state noted cognitive impairments with oxy and other narcotic pain medications  -       Oriented to: Person and Place   -       Follows Commands/attention:Easily distracted and Follows one-step commands  -       Communication: clear/fluent  -       Memory: Impaired STM  -       Safety awareness/insight to disability: impaired   -       Mood/Affect/Coping skills/emotional control: Appropriate to situation    Physical Exam:  Balance: -       Sitting EOB = SBA-CGA due to noted posterior lean with time;  Standing = CGA - min A with RW  Postural examination/scapula alignment:    -        Rounded shoulders  -       Forward head  -       Posterior pelvic tilt  -       Kyphosis  Skin integrity: Visible skin intact  Edema:  None noted  Upper Extremity Range of Motion:   BUEs = WFL for ADLS  Upper Extremity Strength:  formal MMT not performed due to back pain - noted to be WFL for ADLs bilaterally   Strength:  BUEs = WFL    AMPAC 6 Click ADL:  AMPAC Total Score: 15    Treatment & Education:  Pt completed ADLs and func mobility activities for tx session as noted above  Whiteboard updated  Reminded of spinal precautions during ADLs  Required increased time and cues to complete tasks  Pt educated on role of OT and POC      Patient left up in chair with call button in reach and daughters present    GOALS:   Multidisciplinary Problems       Occupational Therapy Goals          Problem: Occupational Therapy    Goal Priority Disciplines Outcome Interventions   Occupational Therapy Goal     OT, PT/OT Ongoing, Progressing    Description: Goals to be met by: 07/03/23     Patient will increase functional independence with ADLs by performing:    UE Dressing with Modified [place].  LE Dressing with Minimal Assistance and Assistive Devices as needed.  Grooming while seated at sink with Modified [place].  Toileting from bedside commode with Minimal Assistance for hygiene and clothing management.   Bathing from  sitting at sink with Minimal Assistance for LB bathing (spinal precautions).  Toilet transfer to bedside commode with Contact Guard Assistance.  Upper extremity exercise program 3x12 reps per handout, with supervision to improve func mobility skills.  Stand for 10' during functional task with CGA in preparation for standing ADLs.                          History:     Past Medical History:   Diagnosis Date    Frozen shoulder     History of atrial fibrillation     History of torn meniscus of left knee     Hypertension     Osteoporosis          Past Surgical History:   Procedure Laterality Date     ANKLE FRACTURE SURGERY  2009    right    CHOLECYSTECTOMY  2008       Time Tracking:     OT Date of Treatment: 06/03/23  OT Start Time: 0839  OT Stop Time: 0955  OT Total Time (min): 76 min    Billable Minutes:Evaluation 31  Self Care/Home Management 46    6/3/2023

## 2023-06-03 NOTE — PLAN OF CARE
Problem: Occupational Therapy  Goal: Occupational Therapy Goal  Description: Goals to be met by: 07/03/23     Patient will increase functional independence with ADLs by performing:    UE Dressing with Modified [place].  LE Dressing with Minimal Assistance and Assistive Devices as needed.  Grooming while seated at sink with Modified [place].  Toileting from bedside commode with Minimal Assistance for hygiene and clothing management.   Bathing from  sitting at sink with Minimal Assistance for LB bathing (spinal precautions).  Toilet transfer to bedside commode with Contact Guard Assistance.  Upper extremity exercise program 3x12 reps per handout, with supervision to improve func mobility skills.  Stand for 10' during functional task with CGA in preparation for standing ADLs.     Outcome: Ongoing, Progressing     Pt was agreeable to and participated in OT evaluation.  Pt lives alone and reports being mod I with all mobility and ADLS at [facility] using DME.  Pt currently noted with spinal precautions due to multiple compression fx on spine with increased pain with movement.  Pt currently requires set up to max A with ADLS and min-mod A with func mobility, along with increased time to complete tasks. Goals established to assist pt with returning to [facility] regarding ADLs and func mobility.  Pt will benefit from skilled OT services in order to increase her level of safety and independence with ADLs and mobility.      Pamela Harrell, OT  6/3/2023

## 2023-06-04 PROCEDURE — 94761 N-INVAS EAR/PLS OXIMETRY MLT: CPT

## 2023-06-04 PROCEDURE — 99305 PR NURSING FACILITY CARE, INIT, MOD SEVERITY: ICD-10-PCS | Mod: AI,,, | Performed by: HOSPITALIST

## 2023-06-04 PROCEDURE — 25000003 PHARM REV CODE 250: Performed by: HOSPITALIST

## 2023-06-04 PROCEDURE — 99305 1ST NF CARE MODERATE MDM 35: CPT | Mod: AI,,, | Performed by: HOSPITALIST

## 2023-06-04 PROCEDURE — 99900035 HC TECH TIME PER 15 MIN (STAT)

## 2023-06-04 PROCEDURE — 97110 THERAPEUTIC EXERCISES: CPT

## 2023-06-04 PROCEDURE — 27000221 HC OXYGEN, UP TO 24 HOURS

## 2023-06-04 PROCEDURE — 11000004 HC SNF PRIVATE

## 2023-06-04 PROCEDURE — 97530 THERAPEUTIC ACTIVITIES: CPT

## 2023-06-04 PROCEDURE — 97162 PT EVAL MOD COMPLEX 30 MIN: CPT

## 2023-06-04 PROCEDURE — 97116 GAIT TRAINING THERAPY: CPT

## 2023-06-04 PROCEDURE — 25000003 PHARM REV CODE 250: Performed by: FAMILY MEDICINE

## 2023-06-04 RX ADMIN — CHOLECALCIFEROL TAB 25 MCG (1000 UNIT) 2000 UNITS: 25 TAB at 10:06

## 2023-06-04 RX ADMIN — CELECOXIB 100 MG: 100 CAPSULE ORAL at 10:06

## 2023-06-04 RX ADMIN — IBUPROFEN 200 MG: 200 TABLET, FILM COATED ORAL at 04:06

## 2023-06-04 RX ADMIN — OXYCODONE HYDROCHLORIDE AND ACETAMINOPHEN 500 MG: 500 TABLET ORAL at 09:06

## 2023-06-04 RX ADMIN — CYANOCOBALAMIN TAB 1000 MCG 1000 MCG: 1000 TAB at 10:06

## 2023-06-04 RX ADMIN — LIDOCAINE 3 PATCH: 50 PATCH TOPICAL at 05:06

## 2023-06-04 RX ADMIN — SENNOSIDES AND DOCUSATE SODIUM 1 TABLET: 50; 8.6 TABLET ORAL at 10:06

## 2023-06-04 RX ADMIN — ACETAMINOPHEN 1000 MG: 500 TABLET ORAL at 02:06

## 2023-06-04 RX ADMIN — ACETAMINOPHEN 1000 MG: 500 TABLET ORAL at 09:06

## 2023-06-04 RX ADMIN — THERA TABS 1 TABLET: TAB at 10:06

## 2023-06-04 RX ADMIN — OXYCODONE HYDROCHLORIDE AND ACETAMINOPHEN 500 MG: 500 TABLET ORAL at 10:06

## 2023-06-04 RX ADMIN — FERROUS SULFATE TAB 325 MG (65 MG ELEMENTAL FE) 1 EACH: 325 (65 FE) TAB at 10:06

## 2023-06-04 RX ADMIN — SENNOSIDES AND DOCUSATE SODIUM 1 TABLET: 50; 8.6 TABLET ORAL at 09:06

## 2023-06-04 RX ADMIN — IBUPROFEN 200 MG: 200 TABLET, FILM COATED ORAL at 09:06

## 2023-06-04 NOTE — PLAN OF CARE
Problem: Physical Therapy  Goal: Physical Therapy Goal  Description: Goals to be met by: 2023     Patient will increase functional independence with mobility by performin. Supine to sit with Stand-by Assistance.  2. Sit to supine with Stand-by Assistance.  3. Rolling to Left and Right with Stand-by Assistance.  4. Sit to stand transfer with Modified [place].  5. Bed to chair transfer with Supervision using Rolling Walker.  6. Gait x 50 feet with Stand-by Assistance using Rolling Walker.   7. Wheelchair propulsion x15 feet with Stand-by Assistance using bilateral upper extremities.  8. Ascend/descend 4 stairs with bilateral Handrails Minimal Assistance.   9. Ascend/Descend 4 inch curb step with Minimal Assistance using Rolling Walker.  10. Lower extremity exercise program x20 reps per handout, with supervision.    Outcome: Ongoing, Progressing

## 2023-06-04 NOTE — PT/OT/SLP EVAL
Physical Therapy Evaluation    Patient Name:  Lacey Mcelroy   MRN:  [id number]  Admit Date: 6/2/2023  Recent Surgeries: N/A    General Precautions: Standard, fall, hearing impaired   Orthopedic Precautions: spinal precautions   Braces: N/A    Recommendations:     Discharge Recommendations: home health PT   Level of Assistance Recommended: 24 hours significant assistance  Discharge Equipment Recommendations: hip kit, walker, rolling   Barriers to discharge: Decreased caregiver support (increased assistance needed at this time)    Assessment:     Lacey Mcelroy is a 82 y.o. female admitted with a medical diagnosis of Accident due to mechanical fall without injury.  Performance deficits affecting function  weakness, impaired endurance, impaired self care skills, impaired functional mobility, gait instability, impaired balance, impaired cognition, decreased lower extremity function, pain, decreased safety awareness, decreased ROM, impaired skin, impaired cardiopulmonary response to activity, orthopedic precautions. She requires SBA with wheelchair mobility, CGA with sit to stand transfers and gait training, Jamar with rolling left and right and bed to chair transfers, and ModA with supine to sitting edge of bed and sitting edge of bed to supine. Patient requires skilled physical therapy services to address deficits and return patient to [facility] with no limitations.     Rehab Potential is good     Activity Tolerance: Good    Plan:     Patient to be seen 5 x/week to address the above listed problems via gait training, therapeutic activities, therapeutic exercises, neuromuscular re-education, wheelchair management/training     Plan of Care Expires: 07/04/23  Plan of Care Reviewed with: patient, daughter    Subjective     Chief Complaint: She is doing good today with pain reports being 6/10 in her right leg.    Patient/Family Comments/goals: To get stronger  Pain/Comfort:  Pain Rating 1: 6/10  Location - Side 1:  Right  Location - Orientation 1: generalized  Location 1: leg  Pain Addressed 1: Reposition, Distraction, Nurse notified  Pain Rating Post-Intervention 1: 6/10    Living Environment:   Patient lives alone in a 1 story home, but is soon moving to a house next to her daughter that is also a 1 story home with a ramp access. She will have a walk in shower and bath bench. Prior to admission, patients level of function was Liam with straight cane. Equipment used at home: bath bench, cane, straight, rollator, bedside commode, wheelchair.  DME owned (not currently used): none.  Upon discharge, patient will have assistance from daughter.    Patients cultural, spiritual, [employer] conflicts given the current situation: no    Objective:     Communicated with nursing staff prior to session. Patient found supine with PureWick, oxygen (2 L via nasal canula) and daughter upon PT entry to room.    Exams:  Cognitive Exam: Patient is oriented to Person and disorient to time, place, and situation (asking multiple times how she got here and what happened).  Gross Motor Coordination: Patient performed heel to shin test indicating intact coordination.  Postural Exam: Patient presented with the following abnormalities:    -       Rounded shoulders  -       Forward head  -       Kyphosis  Sensation:    -       Intact  Skin Integrity/Edema:     -       Skin integrity: Visible skin intact, Thin, and Dry  AROM/Stength:  RLE ROM: WFL  RLE Strength:   Area MMT Grade   Hip Flexion 3/5   Knee Extension 3+/5, pain   Knee Flexion 3+/5   Dorsiflexion 4/5   LLE ROM: WFL  LLE Strength:   Area MMT Grade   Hip Flexion 4/5   Knee Extension 4/5   Knee Flexion 4/5   Dorsiflexion 5/5      Functional Mobility:    06/04/23 0838   Prior Functioning: Everyday Activities   Self Care Independent   Indoor Mobility (Ambulation) Independent   Stairs Independent   Functional Cognition Independent   Prior Device Use None of the given options  (Straight cane)   Roll  Left and Right   Assistance Needed Physical assistance   Physical Assistance Level 25% or less   Comment Jamar with use of bedrails   CARE Score - Roll Left and Right 3   Roll Left and Right Discharge Goal   Discharge Goal 6   Sit to Lying   Assistance Needed Physical assistance   Physical Assistance Level 26%-50%   Comment ModA with use of bedrails and right lower extremity management   CARE Score - Sit to Lying 3   Lying to Sitting on Side of Bed   Assistance Needed Physical assistance   Physical Assistance Level 26%-50%   Comment ModA with use of bedrails and right lower extremity management   CARE Score - Lying to Sitting on Side of Bed 3   Sit to Stand   Assistance Needed Incidental touching   Comment CGA with rolling walker and extended amount of time to perform   CARE Score - Sit to Stand 4   Chair/Bed-to-Chair Transfer   Assistance Needed Physical assistance   Physical Assistance Level 25% or less   Comment Jamar with no assistive device and stand pivot transfer   CARE Score - Chair/Bed-to-Chair Transfer 3   Car Transfer   Reason if not Attempted Environmental limitations   CARE Score - Car Transfer 10   Walk 10 Feet   Comment 8+10+6 feet with rolling walker and CGA   Reason if not Attempted Safety concerns   CARE Score - Walk 10 Feet 88   Walk 50 Feet with Two Turns   Reason if not Attempted Safety concerns   CARE Score - Walk 50 Feet with Two Turns 88   Walk 150 Feet   Reason if not Attempted Safety concerns   CARE Score - Walk 150 Feet 88   Walking 10 Feet on Uneven Surfaces   Reason if not Attempted Safety concerns   CARE Score - Walking 10 Feet on Uneven Surfaces 88   1 Step (Curb)   Reason if not Attempted Safety concerns   CARE Score - 1 Step (Curb) 88   4 Steps   Reason if not Attempted Safety concerns   CARE Score - 4 Steps 88   12 Steps   Reason if not Attempted Safety concerns   CARE Score - 12 Steps 88   Picking Up Object   Reason if not Attempted Safety concerns   CARE Score - Picking Up Object 88    Uses a Wheelchair/Scooter?   Uses a Wheelchair/Scooter? Yes   Wheel 50 Feet with Two Turns   Comment Patient attempted 3 feet with BUEs and SBA. Unable to perform further due to increased right shoulder pain.   Reason if not Attempted Safety concerns   CARE Score - Wheel 50 Feet with Two Turns 88   Type of Wheelchair/Scooter Manual   Wheel 150 Feet   Reason if not Attempted Safety concerns   CARE Score - Wheel 150 Feet 88   Type of Wheelchair/Scooter Manual     Bed Mobility:  Rolling Left: minimum assistance  Rolling Right: minimum assistance  Scooting: stand by assistance  Bridging: stand by assistance with use of left lower extremity  Supine to Sit: moderate assistance for right lower extremity management and extended amount of time to perform.  Sit to Supine: moderate assistance for right lower extremity management and extended amount of time to perform.    Gait: Patient ambulated 8+10+6 feet with rolling walker and CGA. She demonstrated decreased sonya, decreased step length, decreased stride length, decreased toe-to-floor clearance, decreased weight-shifting ability, and step to gait pattern . She required verbal cues to increase step length, but unable to show a reciprocal gait pattern due to pain. She required multiple standing and sitting rest breaks due to right lower extremity pain.    Neuromuscular Exercises:  Balance:  Sitting: Performed sitting EOB and in wheelchair with BUEs as needed. No LOB noted.  Standing: Performed with rolling walker and contact guard assistance. No LOB noted.     Therapeutic Activities and Exercises:   Lower extremity bike: x10 minutes with slight resistance.   Patient donned hospital gown over back with moderate assistance.    Education:  Patient provided with daily orientation and goals of this PT session.  Patient educated to transfer to bedside chair/bedside commode/bathroom with RN/PCT present.  Patient educated on assistive device use, bed mobility training, energy  conservation, Fall risk, gait training, home safety, plan of care, posture, and transfer training by explanation.  Patient Verbalized Understanding.  Time provided for therapeutic counseling and discussion of current health disposition. All questions answered to satisfaction, within scope of PT practice; voiced no other concerns. White board updated in patient's room, RN notified of session.      AM-PAC 6 CLICK MOBILITY  Total Score:13     Patient left with Josy, tech to go back to room and leave in wheelchair  with all lines intact, call button in reach, daughter present, and tray in front to eat breakfast .    GOALS:   Multidisciplinary Problems       Physical Therapy Goals          Problem: Physical Therapy    Goal Priority Disciplines Outcome Goal Variances Interventions   Physical Therapy Goal     PT, PT/OT Ongoing, Progressing     Description: Goals to be met by: 2023     Patient will increase functional independence with mobility by performin. Supine to sit with Stand-by Assistance.  2. Sit to supine with Stand-by Assistance.  3. Rolling to Left and Right with Stand-by Assistance.  4. Sit to stand transfer with Modified [place].  5. Bed to chair transfer with Supervision using Rolling Walker.  6. Gait x 50 feet with Stand-by Assistance using Rolling Walker.   7. Wheelchair propulsion x15 feet with Stand-by Assistance using bilateral upper extremities.  8. Ascend/descend 4 stairs with bilateral Handrails Minimal Assistance.   9. Ascend/Descend 4 inch curb step with Minimal Assistance using Rolling Walker.  10. Lower extremity exercise program x20 reps per handout, with supervision.                         History:     Past Medical History:   Diagnosis Date    Frozen shoulder     History of atrial fibrillation     History of torn meniscus of left knee     Hypertension     Osteoporosis        Past Surgical History:   Procedure Laterality Date    ANKLE FRACTURE SURGERY  2009    right     CHOLECYSTECTOMY  2008       Time Tracking:     PT Received On: 06/04/23  PT Start Time: 0752     PT Stop Time: 0846  PT Total Time (min): 54 min     Billable Minutes: Evaluation 14, Gait Training 10, Therapeutic Activity 20, and Therapeutic Exercise 10      06/04/2023

## 2023-06-04 NOTE — H&P
"Hospital Medicine  Skilled Nursing Facility   History and Physical Exam      Date of Service: 06/04/2023      Patient Name: Lacey Mcelroy  MRN: [id number]  Admission Date: 6/2/2023  Attending Physician: Go Cardona MD  Primary Care Provider: Erica Valladares MD  Code Status: Full Code      Principal problem:  Accident due to mechanical fall without injury      Chief Complaint:   Chief Complaint   Patient presents with    Hip Pain     Admitted to [facility] for rehabilitation following hospital stay for falls, hip/back pain, and UTI.           HPI:   "Lacey Mcelroy is a 82 y.o. female with a PMHx  HTN, syncope, osteoporosis, compression fractures, chronic left shoulder pain, CKD3b s/p mechanical fall. Cardiac causes ruled out. She complained of pain in her right hip, right elbow, lower back, and left leg.  X rays taken in the ED were negative for any acute fractures or dislocations but noted old fractures from previous falls in the thoracic and lumber spine.  CT Head and c-spine were negative. Discharged from ED but daughter was unable to transfer her from car into the home and returned to ED. Repeat XR negative for acute fractures. PT/OT and cleared for discharge to SNF for further therapy.     Patient will be treated at [facility] with PT and OT to improve functional status and ability to perform ADLs.      Interval History  All of today's labs reviewed and are listed below.  24 hr vital sign ranges listed below.  Patient denies shortness of breath, abdominal discomfort, nausea, or vomiting.  Patient reports an adequate appetite but does not like the food here.  Patient's daughter reports complaints of dysuria since admit.  Pain regimen discussed at length, sensitive to opioids and increased lethargy with decrease in O2 sat after receiving dilaudid prior to admit. Oxycodone "too strong" per daughter. Has used APAP and ibuprofen for years for joint pain with good result. Cr 1.0 will continue home dose " "200mg q8h PRN breakthrough pain and scheduled APAP 1000mg q8h. Change oxy to Norco PRN BID for severe pain unrelieved by other meds. Initiate low dose celebrex daily. UA C&S for dysuria and tea-colored urine. Initiate Vitamin C to acidify urine. Daughter with increased anxiety and hypervigilance regarding fractures and previous imaging results. Continuing to follow and treat all acute and chronic conditions." Per Caroline Linares NP on 6/3/23.     She is doing okay this morning. Daughter, Pamela, is at her bedside currently. She is sitting up in the wheelchair and has been up since before breakfast. She says that she is ready to get back to bed. She reports some back and right knee pain currently and has some right hip pain when she stands and walks. Her pain is controlled somewhat with the pain medications. She reports having some constipation currently and just needs some "Mexican beans". She says that her appetite is okay, but she doesn't like the food. When told she can have someone bring her food, she requested a [employer] Sampler from [employer] [facility].     Patient admitted with skilled services with PT and OT to improve functional status and ability to perform ADLs.       Past Medical History:   Past Medical History:   Diagnosis Date    Frozen shoulder     History of atrial fibrillation     History of torn meniscus of left knee     Hypertension     Osteoporosis        Past Surgical History:   Past Surgical History:   Procedure Laterality Date    ANKLE FRACTURE SURGERY  2009    right    CHOLECYSTECTOMY  2008       Social History:   Tobacco Use    Smoking status: Never    Smokeless tobacco: Never   Substance and Sexual Activity    Alcohol use: No    Drug use: Not on file    Sexual activity: Not on file       Family History:   Family History       Problem Relation (Age of Onset)    Diabetes type II Sister    Heart disease Father, Brother, Sister    Hypertension Father, Sister, Brother, Sister    Peripheral vascular disease " Brother    Rheumatic fever Sister            No current facility-administered medications on file prior to encounter.     Current Outpatient Medications on File Prior to Encounter   Medication Sig    acetaminophen (TYLENOL) 500 MG tablet Take 500 mg by mouth every 6 (six) hours as needed for Pain.    cholecalciferol, vitamin D3, (VITAMIN D3) 50 mcg (2,000 unit) Cap Take 1 capsule by mouth once daily.    cyanocobalamin (VITAMIN B-12) 1000 MCG tablet Take 1 tablet (1,000 mcg total) by mouth once daily.    ferrous sulfate (FEOSOL) Tab tablet Take 1 tablet (1 each total) by mouth daily with breakfast.    gabapentin (NEURONTIN) 100 MG capsule Take 1 capsule (100 mg total) by mouth every evening.    ibuprofen (ADVIL,MOTRIN) 200 MG tablet Take 200 mg by mouth every 6 (six) hours as needed for Pain.    LIDOcaine (LIDODERM) 5 % Place 3 patches onto the skin once daily. Remove & Discard patch within 12 hours or as directed by MD    MINERAL OIL/PETROLATUM,WHITE (REFRESH P.M. OPHT) Apply to eye nightly.    MULTIVIT &MINERALS/FERROUS FUM (MULTI VITAMIN ORAL) Take by mouth.    PROPYLENE GLYCOL//PF (SYSTANE, PF, OPHT) Apply to eye 4 (four) times daily.       Allergies:   Review of patient's allergies indicates:   Allergen Reactions    Darvocet a500 [propoxyphene n-acetaminophen] Other (See Comments)     hallucinations    Lortab [hydrocodone-acetaminophen] Hives    Tramadol        ROS:  Review of Systems   Constitutional:  Negative for appetite change and fever.   Respiratory:  Negative for cough and shortness of breath.    Cardiovascular:  Negative for chest pain and palpitations.   Gastrointestinal:  Positive for constipation. Negative for abdominal pain, nausea and vomiting.   Genitourinary:  Negative for difficulty urinating and dysuria.   Musculoskeletal:  Positive for arthralgias and back pain. Negative for joint swelling.   Neurological:  Positive for weakness. Negative for dizziness and light-headedness.    Psychiatric/Behavioral:  Negative for sleep disturbance. The patient is not nervous/anxious.        Objective:  Temp:  [97.9 °F (36.6 °C)-99.8 °F (37.7 °C)]   Pulse:  [66-97]   Resp:  [16-18]   BP: (132-155)/(62-63)   SpO2:  [96 %-100 %]     Body mass index is 19.53 kg/m².      Physical Exam  Vitals and nursing note reviewed.   Constitutional:       General: She is not in acute distress.     Appearance: She is well-developed.      Interventions: Nasal cannula in place.   Cardiovascular:      Rate and Rhythm: Normal rate and regular rhythm.      Heart sounds: S1 normal and S2 normal. No murmur heard.  Pulmonary:      Effort: Pulmonary effort is normal. No respiratory distress.      Breath sounds: Normal breath sounds. No wheezing or rales.   Abdominal:      General: Bowel sounds are normal. There is no distension.      Palpations: Abdomen is soft.      Tenderness: There is no abdominal tenderness.   Musculoskeletal:         General: No tenderness.      Right hip: No tenderness.      Right knee: No swelling. No tenderness.      Right lower leg: No edema.      Left lower leg: No edema.   Skin:     General: Skin is warm and dry.      Findings: Bruising present.   Neurological:      Mental Status: She is alert and oriented to person, place, and time.   Psychiatric:         Mood and Affect: Mood normal.         Behavior: Behavior normal. Behavior is cooperative.         Thought Content: Thought content normal.       Significant Labs:   CBC:  Recent Labs   Lab 06/02/23 0447   WBC 6.25   HGB 9.7*   HCT 29.1*   *   MCV 92     BMP:  Recent Labs   Lab 06/02/23 0447   GLU 99      K 3.5      CO2 22*   BUN 25*   CREATININE 1.0   CALCIUM 8.8   MG 2.2   PHOS 3.0     LFTs:  Recent Labs   Lab 06/02/23 0447   ALT 16   AST 23   ALKPHOS 84   BILITOT 0.7   PROT 6.0   ALBUMIN 3.0*       Significant Imaging: I have reviewed all pertinent imaging results/findings completed during prior  hospitalization.      Assessment and Plan:  Active Diagnoses:    Diagnosis Date Noted POA    PRINCIPAL PROBLEM:  Accident due to mechanical fall without injury [W19.XXXA] 06/01/2023 Yes    Acute low back pain [M54.50] 06/03/2023 Yes    Hypoxia [R09.02] 06/02/2023 Yes    Physical deconditioning [R53.81] 06/01/2023 Yes    Compression fracture of lumbar vertebra [S32.000A] 12/23/2021 Yes    Stage 3b chronic kidney disease [N18.32] 12/23/2021 Yes     Chronic    Anemia in stage 3 chronic kidney disease [N18.30, D63.1] 08/22/2016 Yes     Chronic    Vasovagal syncope [R55] 12/28/2015 Yes    Mitral prolapse [I34.1] 06/29/2015 Yes    Anxiety [F41.9] 05/26/2014 Yes    Osteoporosis [M81.0] 11/07/2013 Yes    History of atrial fibrillation [Z86.79]  Not Applicable    Essential hypertension [I10] 09/06/2012 Yes     Chronic      Problems Resolved During this Admission:       Mechanical Fall  Hx Syncopal episodes  Hx multiple falls  Neuropathic pain  Imaging no acute fractures, multiple compression fractures of indeterminate age  Cardiac causes ruled out, tripped over cane at home  Opioid naive, required O2 after receiving dilaudid 86% sat  Tylenol, ibuprofen effective at home for years  6/3/2023  Continue home dose ibuprofen 200mg q8h PRN breakthrough pain and scheduled APAP 1000mg q8h. Continue Norco 5/325 PRN BID for severe pain unrelieved by other meds. Continue lidoderm.  Continue celebrex 100 mg daily.  Fall precautions  Add menthol rub for right knee and back.      Confusion  Lethargy  6/3/2023  Avoid opioids  UA negative.      Normocytic anemia  Anemia of chronic disease  Hgb 9.7 on admit  Iron panel shows iron 13, TIBC 339, ferritin 397, consistent with AoCI   B12 244 and folate 6.1, continue PO supplement     CKD stage 3b  Monitor kidney function on routine labs  Long term NSAID use, ok to continue based on admit labs     Osteoporosis  Refused outpatient medication/treatment previously  High risk for fractures; x rays  negative for acute fractures this admission, but evidence of remote fractures in thoracic and lumbar spine noted  Continue Vitamin D supplementation and follow up with PCP   Fall precautions    Debility   - Continue with PT/OT for gait training and strengthening and restoration of ADL's   - Encourage mobility, OOB in chair, and early ambulation as appropriate  - Fall precautions   - Monitor for bowel and bladder dysfunction  - Monitor for and prevent skin breakdown and pressure ulcers      Anticipated Disposition:   Home with home health      No future appointments.    I certify that SNF services are required to be given on an inpatient basis because Lacey Mcelroy needs for skilled nursing care and/or skilled rehabilitation are required on a daily basis and such services can only practically be provided in a skilled nursing facility setting and are for an ongoing condition for which she received inpatient care in the hospital.       Go Cardona MD  Department of Hospital Medicine   [facility]

## 2023-06-04 NOTE — PLAN OF CARE
Problem: Adult Inpatient Plan of Care  Goal: Plan of Care Review  6/4/2023 1513 by Dashawn Franz LPN  Outcome: Ongoing, Progressing  6/4/2023 1349 by Dashawn Franz LPN  Outcome: Ongoing, Progressing  Goal: Patient-Specific Goal (Individualized)  6/4/2023 1513 by Dashawn Franz LPN  Outcome: Ongoing, Progressing  6/4/2023 1349 by Dashawn Franz LPN  Outcome: Ongoing, Progressing  Goal: Absence of Hospital-Acquired Illness or Injury  6/4/2023 1513 by Dashawn Franz LPN  Outcome: Ongoing, Progressing  6/4/2023 1349 by Dashawn Franz LPN  Outcome: Ongoing, Progressing  Goal: Optimal Comfort and Wellbeing  6/4/2023 1513 by Dashawn Franz LPN  Outcome: Ongoing, Progressing  6/4/2023 1349 by Dashawn Franz LPN  Outcome: Ongoing, Progressing  Goal: Readiness for Transition of Care  6/4/2023 1513 by Dashawn Franz LPN  Outcome: Ongoing, Progressing  6/4/2023 1349 by Dashawn Franz LPN  Outcome: Ongoing, Progressing

## 2023-06-05 VITALS
WEIGHT: 100.38 LBS | HEIGHT: 60 IN | HEART RATE: 85 BPM | BODY MASS INDEX: 19.71 KG/M2 | TEMPERATURE: 98 F | OXYGEN SATURATION: 95 % | SYSTOLIC BLOOD PRESSURE: 199 MMHG | RESPIRATION RATE: 17 BRPM | DIASTOLIC BLOOD PRESSURE: 86 MMHG

## 2023-06-05 PROBLEM — R09.02 HYPOXIA: Status: RESOLVED | Noted: 2023-06-02 | Resolved: 2023-06-05

## 2023-06-05 LAB
ANION GAP SERPL CALC-SCNC: 7 MMOL/L (ref 8–16)
BASOPHILS # BLD AUTO: 0.02 K/UL (ref 0–0.2)
BASOPHILS NFR BLD: 0.4 % (ref 0–1.9)
BUN SERPL-MCNC: 24 MG/DL (ref 8–23)
CALCIUM SERPL-MCNC: 9.1 MG/DL (ref 8.7–10.5)
CHLORIDE SERPL-SCNC: 106 MMOL/L (ref 95–110)
CO2 SERPL-SCNC: 28 MMOL/L (ref 23–29)
CREAT SERPL-MCNC: 0.9 MG/DL (ref 0.5–1.4)
DIFFERENTIAL METHOD: ABNORMAL
EOSINOPHIL # BLD AUTO: 0.1 K/UL (ref 0–0.5)
EOSINOPHIL NFR BLD: 1.5 % (ref 0–8)
ERYTHROCYTE [DISTWIDTH] IN BLOOD BY AUTOMATED COUNT: 13.1 % (ref 11.5–14.5)
EST. GFR  (NO RACE VARIABLE): >60 ML/MIN/1.73 M^2
GLUCOSE SERPL-MCNC: 91 MG/DL (ref 70–110)
HCT VFR BLD AUTO: 27.1 % (ref 37–48.5)
HGB BLD-MCNC: 8.5 G/DL (ref 12–16)
IMM GRANULOCYTES # BLD AUTO: 0 K/UL (ref 0–0.04)
IMM GRANULOCYTES NFR BLD AUTO: 0 % (ref 0–0.5)
LYMPHOCYTES # BLD AUTO: 1 K/UL (ref 1–4.8)
LYMPHOCYTES NFR BLD: 21.9 % (ref 18–48)
MAGNESIUM SERPL-MCNC: 1.6 MG/DL (ref 1.6–2.6)
MCH RBC QN AUTO: 29.4 PG (ref 27–31)
MCHC RBC AUTO-ENTMCNC: 31.4 G/DL (ref 32–36)
MCV RBC AUTO: 94 FL (ref 82–98)
MONOCYTES # BLD AUTO: 0.4 K/UL (ref 0.3–1)
MONOCYTES NFR BLD: 8.2 % (ref 4–15)
NEUTROPHILS # BLD AUTO: 3.1 K/UL (ref 1.8–7.7)
NEUTROPHILS NFR BLD: 68 % (ref 38–73)
NRBC BLD-RTO: 0 /100 WBC
PHOSPHATE SERPL-MCNC: 3.6 MG/DL (ref 2.7–4.5)
PLATELET # BLD AUTO: 169 K/UL (ref 150–450)
PMV BLD AUTO: 9.7 FL (ref 9.2–12.9)
POTASSIUM SERPL-SCNC: 3.4 MMOL/L (ref 3.5–5.1)
RBC # BLD AUTO: 2.89 M/UL (ref 4–5.4)
SODIUM SERPL-SCNC: 141 MMOL/L (ref 136–145)
WBC # BLD AUTO: 4.62 K/UL (ref 3.9–12.7)

## 2023-06-05 PROCEDURE — 97535 SELF CARE MNGMENT TRAINING: CPT | Mod: CO

## 2023-06-05 PROCEDURE — 97530 THERAPEUTIC ACTIVITIES: CPT | Mod: CQ

## 2023-06-05 PROCEDURE — 84100 ASSAY OF PHOSPHORUS: CPT | Performed by: HOSPITALIST

## 2023-06-05 PROCEDURE — 97110 THERAPEUTIC EXERCISES: CPT | Mod: CO

## 2023-06-05 PROCEDURE — 83735 ASSAY OF MAGNESIUM: CPT | Performed by: HOSPITALIST

## 2023-06-05 PROCEDURE — 25000003 PHARM REV CODE 250: Performed by: HOSPITALIST

## 2023-06-05 PROCEDURE — 36415 COLL VENOUS BLD VENIPUNCTURE: CPT | Performed by: HOSPITALIST

## 2023-06-05 PROCEDURE — 85025 COMPLETE CBC W/AUTO DIFF WBC: CPT | Performed by: HOSPITALIST

## 2023-06-05 PROCEDURE — 27000221 HC OXYGEN, UP TO 24 HOURS

## 2023-06-05 PROCEDURE — 97116 GAIT TRAINING THERAPY: CPT | Mod: CQ

## 2023-06-05 PROCEDURE — 97110 THERAPEUTIC EXERCISES: CPT | Mod: CQ

## 2023-06-05 PROCEDURE — 25000003 PHARM REV CODE 250: Performed by: FAMILY MEDICINE

## 2023-06-05 PROCEDURE — 99900035 HC TECH TIME PER 15 MIN (STAT)

## 2023-06-05 PROCEDURE — 80048 BASIC METABOLIC PNL TOTAL CA: CPT | Performed by: HOSPITALIST

## 2023-06-05 PROCEDURE — 94761 N-INVAS EAR/PLS OXIMETRY MLT: CPT

## 2023-06-05 RX ORDER — ONDANSETRON 4 MG/1
4 TABLET, ORALLY DISINTEGRATING ORAL EVERY 8 HOURS PRN
Status: DISCONTINUED | OUTPATIENT
Start: 2023-06-05 | End: 2023-06-05 | Stop reason: HOSPADM

## 2023-06-05 RX ORDER — POLYETHYLENE GLYCOL 3350 17 G/17G
17 POWDER, FOR SOLUTION ORAL DAILY
Status: DISCONTINUED | OUTPATIENT
Start: 2023-06-05 | End: 2023-06-05 | Stop reason: HOSPADM

## 2023-06-05 RX ORDER — FAMOTIDINE 20 MG/1
20 TABLET, FILM COATED ORAL DAILY
Status: DISCONTINUED | OUTPATIENT
Start: 2023-06-05 | End: 2023-06-05 | Stop reason: HOSPADM

## 2023-06-05 RX ORDER — LACTULOSE 10 G/15ML
20 SOLUTION ORAL ONCE
Status: COMPLETED | OUTPATIENT
Start: 2023-06-05 | End: 2023-06-05

## 2023-06-05 RX ORDER — POTASSIUM CHLORIDE 20 MEQ/1
20 TABLET, EXTENDED RELEASE ORAL ONCE
Status: COMPLETED | OUTPATIENT
Start: 2023-06-05 | End: 2023-06-05

## 2023-06-05 RX ORDER — CLONIDINE HYDROCHLORIDE 0.1 MG/1
0.1 TABLET ORAL ONCE
Status: COMPLETED | OUTPATIENT
Start: 2023-06-05 | End: 2023-06-05

## 2023-06-05 RX ORDER — CELECOXIB 100 MG/1
100 CAPSULE ORAL DAILY
Qty: 30 CAPSULE | Refills: 1 | Status: SHIPPED | OUTPATIENT
Start: 2023-06-06

## 2023-06-05 RX ORDER — CLONIDINE HYDROCHLORIDE 0.1 MG/1
0.1 TABLET ORAL EVERY 8 HOURS PRN
Status: DISCONTINUED | OUTPATIENT
Start: 2023-06-05 | End: 2023-06-05 | Stop reason: HOSPADM

## 2023-06-05 RX ORDER — POLYETHYLENE GLYCOL 3350 17 G/17G
17 POWDER, FOR SOLUTION ORAL DAILY
Qty: 30 PACKET | Refills: 1 | Status: SHIPPED | OUTPATIENT
Start: 2023-06-06

## 2023-06-05 RX ADMIN — ACETAMINOPHEN 1000 MG: 500 TABLET ORAL at 01:06

## 2023-06-05 RX ADMIN — POTASSIUM CHLORIDE 20 MEQ: 1500 TABLET, EXTENDED RELEASE ORAL at 03:06

## 2023-06-05 RX ADMIN — CLONIDINE HYDROCHLORIDE 0.1 MG: 0.1 TABLET ORAL at 03:06

## 2023-06-05 RX ADMIN — POLYETHYLENE GLYCOL 3350 17 G: 17 POWDER, FOR SOLUTION ORAL at 10:06

## 2023-06-05 RX ADMIN — MENTHOL, METHYL SALICYLATE: 10; 15 CREAM TOPICAL at 03:06

## 2023-06-05 RX ADMIN — CELECOXIB 100 MG: 100 CAPSULE ORAL at 10:06

## 2023-06-05 RX ADMIN — LACTULOSE 20 G: 20 SOLUTION ORAL at 02:06

## 2023-06-05 NOTE — PLAN OF CARE
Continue regular diet, double meats, RD following  Goals: PO to meet assessed needs by next RD fu  Nutrition Goal Status: new  Communication of RD Recs: other (comment) (POC)     Assessment and Plan  Increased calorie needs for weight gain as evidenced by BMI 19,     Plan  General diet  Assist with set up  Increased protein diet- double meats  Collaboration with other providers

## 2023-06-05 NOTE — NURSING
Pt refused all morning meds except Celebrex. Pt c/o being nauseous and in pain  [facility] .Pt Pt up in recliner with family at beside. Will monitor. ARLEEN Watkins and charge nurse notified

## 2023-06-05 NOTE — PT/OT/SLP PROGRESS
Occupational Therapy   Treatment    Name: Lacey Mcelroy  MRN: [id number]  Admit Date: 6/2/2023  Admitting Diagnosis:  Accident due to mechanical fall without injury    General Precautions: Standard, fall, hearing impaired   Orthopedic Precautions: spinal precautions   Braces: N/A    Recommendations:     Discharge Recommendations:  home health OT  Level of Assistance Recommended at Discharge:  24 hour significant assistance   Discharge Equipment Recommendations: hip kit, walker, rolling  Barriers to discharge:  None    Assessment:     Lacey Mcelroy is a 82 y.o. female with a medical diagnosis of Accident due to mechanical fall without injury.  She presents with limitations in performance of self-care, functional mobility, and ADLs. Performance deficits affecting function are weakness, impaired endurance, impaired sensation, impaired self care skills, impaired functional mobility, gait instability, impaired balance, decreased coordination, decreased upper extremity function, decreased lower extremity function, decreased safety awareness, pain, impaired coordination, impaired skin, orthopedic precautions. Pt tolerated Tx without incident, however pt reported significant pain in right leg with limited participation. and is making progress but continues to require assist to perform self care tasks, functional mobility and functional transfers. Pt would continue to benefit from OT intervention to further functional (I)ce and safety.     Rehab Potential is good    Activity tolerance:  Fair    Plan:     Patient to be seen 5 x/week to address the above listed problems via self-care/home management, therapeutic activities, therapeutic exercises    Plan of Care Expires: 07/03/23  Plan of Care Reviewed with: patient, daughter    Subjective     Communicated with: Nursing prior to session.     Pain/Comfort:  Pain Rating 1: 10/10  Location - Side 1: Right  Location - Orientation 1: generalized  Location 1:  (leg/ankle)  Pain  Addressed 1: Reposition, Distraction  Pain Rating Post-Intervention 1: 10/10    Patient's cultural, spiritual, Denominational conflicts given the current situation:  no    Objective:     Patient found up in chair with PCT and daughter present with oxygen upon OT entry to room.    Bed Mobility:    Pt seated in chair at onset of treatment session.    Activities of Daily Living:  Grooming: stand by assistance seated sinkside with set up    AMPA 6 Click ADL: 15    OT Exercises: Pt performed UBE exercise for 10 minutes with Min resistance with frequent breaks secondary to fatigue.  UE exercises performed to increase functional endurance and strength in order increase independence when performing self care tasks, functional ambulation, W/C propulsion, and functional standing activities .      Treatment & Education:  Pt educated on:  - role of OT  - level of assistance  - energy conservation and task modification to maximized independence with ADL's and mobility   -  safety while performing functional transfers and self care tasks  - progress towards OT goals      Patient left up in chair with  PTA present    GOALS:   Multidisciplinary Problems       Occupational Therapy Goals          Problem: Occupational Therapy    Goal Priority Disciplines Outcome Interventions   Occupational Therapy Goal     OT, PT/OT Ongoing, Progressing    Description: Goals to be met by: 07/03/23     Patient will increase functional independence with ADLs by performing:    UE Dressing with Modified [place].  LE Dressing with Minimal Assistance and Assistive Devices as needed.  Grooming while seated at sink with Modified [place].  Toileting from bedside commode with Minimal Assistance for hygiene and clothing management.   Bathing from  sitting at sink with Minimal Assistance for LB bathing (spinal precautions).  Toilet transfer to bedside commode with Contact Guard Assistance.  Upper extremity exercise program 3x12 reps per handout, with  supervision to improve [facility] mobility skills.  Stand for 10' during functional task with CGA in preparation for standing ADLs.                          Time Tracking:     OT Date of Treatment: 06/05/23  OT Start Time: 0935    OT Stop Time: 1007  OT Total Time (min): 32 min    Billable Minutes:Self Care/Home Management 15  Therapeutic Exercise 17    6/5/2023  A client care conference was performed between the LOTR and JANE, prior to treatment by JANE, to discuss the patient's status, treatment plan and established goals.

## 2023-06-05 NOTE — PT/OT/SLP PROGRESS
"Physical Therapy Treatment    Patient Name:  Lacey Mcelroy   MRN:  [id number]  Admit Date: 6/2/2023  Admitting Diagnosis: Accident due to mechanical fall without injury  Recent Surgeries:     General Precautions: Standard, fall, hearing impaired  Orthopedic Precautions: spinal precautions  Braces: N/A    Recommendations:     Discharge Recommendations: home health PT  Level of Assistance Recommended at Discharge: 24 hours significant assistance  Discharge Equipment Recommendations: hip kit, walker, rolling  Barriers to discharge: Decreased caregiver support (increased assistance needed at this time)    Assessment:     Lacey Mcelroy is a 82 y.o. female admitted with a medical diagnosis of Accident due to mechanical fall without injury . Pt tolerated fairly well,  moves very slowly 2* to R leg pain and reports being fearful, pt is pleasant, demo good effort, fairly receptive to education and the importance of PT services, pt would continue to benefit from skilled PT services to improve overall functional mobility, strength and endurance.  .      Performance deficits affecting function: weakness, impaired endurance, impaired self care skills, impaired functional mobility, gait instability, impaired balance, impaired cognition, decreased lower extremity function, pain, decreased safety awareness, decreased ROM, impaired skin, impaired cardiopulmonary response to activity, orthopedic precautions.    Rehab Potential is good    Activity Tolerance: Fair    Plan:     Patient to be seen 5 x/week to address the above listed problems via gait training, therapeutic activities, therapeutic exercises, neuromuscular re-education, wheelchair management/training    Plan of Care Expires: 07/04/23  Plan of Care Reviewed with: patient, daughter    Subjective     "My leg hurts" "I'm gonna try  pt agreeable to therapy.     Pain/Comfort:  Pain Rating 1: 9/10  Location - Side 1: Right  Location - Orientation 1: generalized  Location 1: " leg  Pain Addressed 1: Reposition, Distraction, Cessation of Activity, Nurse notified (nsg present)    Patient's cultural, spiritual, Yazidi conflicts given the current situation:  no    Objective:     Communicated with nsg prior to session.re pts c/o pain and having BM  Patient found with  (in wc) upon PT entry to room.     Therapeutic Activities and Exercises: x10 reps AP,LAQ,hip flex pt A/AA RLE hip flex  Ed on pain free ROM as tolerated  Patient Education: Education on the importance/benefits of PT services, Safety/proper tech with trfs/gait, On the importance of increasing OOB tolerance/prolong affects of bed rest, Use of the call button for A with OOB mobility/use of AD/fall risk.  Ed pt and daughter on POC and pts progress      Functional Mobility:  Transfers:     Sit to Stand:  stand by assistance and contact guard assistance with rolling walker and vcs for tech and from BScommode  Bed to Chair: contact guard assistance with  rolling walker  using  Stand Pivot and ~ 5 ft from WC to [facility] vcs for getting directly in front of chair before sitting  Toilet Transfer: contact guard assistance with  rolling walker  using  Stand Pivot  A with clothing mgmt, CGA for self ofelia care in standing, set up/SBA in sitting, set up for hand hygiene in sitting. A with clothing mgmt  Gait: amb with RW CGA ~ 13 ft no LOB slow sonya, dec B step length, vcs for inc BUE support to off load LE and vcs for getting closer to RW    AM-PAC 6 CLICK MOBILITY  14    Patient left up in chair with call button in reach and belonging sin reach .    GOALS:   Multidisciplinary Problems       Physical Therapy Goals          Problem: Physical Therapy    Goal Priority Disciplines Outcome Goal Variances Interventions   Physical Therapy Goal     PT, PT/OT Ongoing, Progressing     Description: Goals to be met by: 2023     Patient will increase functional independence with mobility by performin. Supine to sit with Stand-by  Assistance.  2. Sit to supine with Stand-by Assistance.  3. Rolling to Left and Right with Stand-by Assistance.  4. Sit to stand transfer with Modified [place].  5. Bed to chair transfer with Supervision using Rolling Walker.  6. Gait x 50 feet with Stand-by Assistance using Rolling Walker.   7. Wheelchair propulsion x15 feet with Stand-by Assistance using bilateral upper extremities.  8. Ascend/descend 4 stairs with bilateral Handrails Minimal Assistance.   9. Ascend/Descend 4 inch curb step with Minimal Assistance using Rolling Walker.  10. Lower extremity exercise program x20 reps per handout, with supervision.                         Time Tracking:     PT Received On: 06/05/23  PT Start Time: 1019  PT Stop Time: 1120  PT Total Time (min): 61 min    Billable Minutes: Gait Training 10, Therapeutic Activity 36, and Therapeutic Exercise 15    Treatment Type: Treatment  PT/PTA: PTA     Number of PTA visits since last PT visit: 1 06/05/2023

## 2023-06-05 NOTE — PLAN OF CARE
[facility]      HOME HEALTH ORDERS  FACE TO FACE ENCOUNTER    Patient Name: Lacey Mcelroy  YOB: 1940    PCP: Erica Valladares MD   PCP Address: [address] [place] [address] / [place]*  PCP Phone Number: [phone]  PCP Fax: [phone]    Encounter Date: 6/5/23    Admit to Home Health    Diagnoses:  Active Hospital Problems    Diagnosis  POA    *Accident due to mechanical fall without injury [W19.XXXA]  Yes     Priority: 1 - High    Acute low back pain [M54.50]  Yes    Physical deconditioning [R53.81]  Yes    Compression fracture of lumbar vertebra [S32.000A]  Yes    Stage 3b chronic kidney disease [N18.32]  Yes     Chronic    Anemia in stage 3 chronic kidney disease [N18.30, D63.1]  Yes     Chronic    Vasovagal syncope [R55]  Yes    Mitral prolapse [I34.1]  Yes     With mild MR      Anxiety [F41.9]  Yes    Osteoporosis [M81.0]  Yes     prolia therapy plan created 9/8/2016.  Continue calcium and vitamin d.       History of atrial fibrillation [Z86.79]  Not Applicable    Essential hypertension [I10]  Yes     Chronic      Resolved Hospital Problems    Diagnosis Date Resolved POA    Hypoxia [R09.02] 06/05/2023 Yes       Follow Up Appointments:  No future appointments.    Allergies:  Review of patient's allergies indicates:   Allergen Reactions    Darvocet a500 [propoxyphene n-acetaminophen] Other (See Comments)     hallucinations    Lortab [hydrocodone-acetaminophen] Hives    Tramadol        Medications: Review discharge medications with patient and family and provide education.    Current Facility-Administered Medications   Medication Dose Route Frequency Provider Last Rate Last Admin    acetaminophen tablet 1,000 mg  1,000 mg Oral Q8H CANDIE Alonso   1,000 mg at 06/05/23 1318    ascorbic acid (vitamin C) tablet 500 mg  500 mg Oral BID CANDIE Alonso   500 mg at 06/04/23 2145    calcium carbonate 200 mg calcium (500 mg) chewable tablet 500 mg   500 mg Oral BID PRN Go Cardona MD        celecoxib capsule 100 mg  100 mg Oral Daily Caroline Linares, APRN   100 mg at 06/05/23 1030    cloNIDine tablet 0.1 mg  0.1 mg Oral Q8H PRN CANDIE Alonso        cyanocobalamin tablet 1,000 mcg  1,000 mcg Oral Daily Go Cardona MD   1,000 mcg at 06/04/23 1001    famotidine tablet 20 mg  20 mg Oral Daily CANDIE Alonso        ferrous sulfate tablet 1 each  1 tablet Oral Every other day Caroline Linares, APRN   1 each at 06/04/23 1001    HYDROcodone-acetaminophen 5-325 mg per tablet 1 tablet  1 tablet Oral BID PRN CANDIE Alonso        ibuprofen tablet 200 mg  200 mg Oral Q8H PRN Caroline Linares, APRN   200 mg at 06/04/23 2146    LIDOcaine 5 % patch 3 patch  3 patch Transdermal Q24H Go Cardona MD   3 patch at 06/04/23 1740    melatonin tablet 6 mg  6 mg Oral Nightly PRN Go Cardona MD        methyl salicylate-menthol 15-10% cream   Topical (Top) TID Go Cardona MD   Given at 06/05/23 1504    multivitamin tablet  1 tablet Oral Daily Go Cardona MD   1 tablet at 06/04/23 1001    ondansetron disintegrating tablet 4 mg  4 mg Oral Q8H PRN Caroline Linares APRN        polyethylene glycol packet 17 g  17 g Oral Daily CANDIE Alonso   17 g at 06/05/23 1025    senna-docusate 8.6-50 mg per tablet 1 tablet  1 tablet Oral BID Go Cardona MD   1 tablet at 06/04/23 2146    vitamin D 1000 units tablet 2,000 Units  2,000 Units Oral Daily Go Cardona MD   2,000 Units at 06/04/23 1001    white petrolatum-mineral oil 57.3-42.5% ophthalmic ointment   Both Eyes QHS Go Cardona MD         Current Discharge Medication List        START taking these medications    Details   celecoxib (CELEBREX) 100 MG capsule Take 1 capsule (100 mg total) by mouth once daily.  Qty: 30 capsule, Refills: 1      methyl salicylate-menthol 15-10% 15-10 % Crea Apply topically 3 (three) times daily. Apply to right knee and lower back  Qty: 84.7 g,  Refills: 1      polyethylene glycol (GLYCOLAX) 17 gram PwPk Take 17 g by mouth once daily. For constipation, hold for loose stool  Qty: 30 packet, Refills: 1           CONTINUE these medications which have NOT CHANGED    Details   acetaminophen (TYLENOL) 500 MG tablet Take 500 mg by mouth every 6 (six) hours as needed for Pain.      cholecalciferol, vitamin D3, (VITAMIN D3) 50 mcg (2,000 unit) Cap Take 1 capsule by mouth once daily.      cyanocobalamin (VITAMIN B-12) 1000 MCG tablet Take 1 tablet (1,000 mcg total) by mouth once daily.      ferrous sulfate (FEOSOL) Tab tablet Take 1 tablet (1 each total) by mouth daily with breakfast.  Qty: 90 tablet, Refills: 3    Associated Diagnoses: Anemia, unspecified type      ibuprofen (ADVIL,MOTRIN) 200 MG tablet Take 200 mg by mouth every 6 (six) hours as needed for Pain.      MINERAL OIL/PETROLATUM,WHITE (REFRESH P.M. OPHT) Apply to eye nightly.      MULTIVIT &MINERALS/FERROUS FUM (MULTI VITAMIN ORAL) Take by mouth.           STOP taking these medications       gabapentin (NEURONTIN) 100 MG capsule Comments:   Reason for Stopping:         LIDOcaine (LIDODERM) 5 % Comments:   Reason for Stopping:         PROPYLENE GLYCOL//PF (SYSTANE, PF, OPHT) Comments:   Reason for Stopping:                 I have seen and examined this patient within the last 30 days. My clinical findings that support the need for the home health skilled services and home bound status are the following:no   Weakness/numbness causing balance and gait disturbance due to Weakness/Debility making it taxing to leave home.  Requiring assistive device to leave home due to unsteady gait caused by  Weakness/Debility.  Medical restrictions requiring assistance of another human to leave home due to  Unstable ambulation, Frequent Falls, and Decreased range of motions in extremities.     Diet:   regular diet    Labs: Per protocol  Report Lab results to PCP.    Referrals/ Consults  Physical Therapy to evaluate  and treat. Evaluate for home safety and equipment needs; Establish/upgrade home exercise program. Perform / instruct on therapeutic exercises, gait training, transfer training, and Range of Motion.  Occupational Therapy to evaluate and treat. Evaluate home environment for safety and equipment needs. Perform/Instruct on transfers, ADL training, ROM, and therapeutic exercises.  Aide to provide assistance with personal care, ADLs, and vital signs.    Activities:   activity as tolerated    Nursing:   Agency to admit patient within 24 hours of hospital discharge unless specified on physician order or at patient request    SN to complete comprehensive assessment including routine vital signs. Instruct on disease process and s/s of complications to report to MD. Review/verify medication list sent home with the patient at time of discharge  and instruct patient/caregiver as needed. Frequency may be adjusted depending on start of care date.     Skilled nurse to perform up to 3 visits PRN for symptoms related to diagnosis    Notify MD if SBP > 160 or < 90; DBP > 90 or < 50; HR > 120 or < 50; Temp > 101; O2 < 88%;    Ok to schedule additional visits based on staff availability and patient request on consecutive days within the home health episode.    When multiple disciplines ordered:    Start of Care occurs on Sunday - Wednesday schedule remaining discipline evaluations as ordered on separate consecutive days following the start of care.    Thursday SOC -schedule subsequent evaluations Friday and Monday the following week.     Friday - Saturday SOC - schedule subsequent discipline evaluations on consecutive days starting Monday of the following week.    For all post-discharge communication and subsequent orders please contact patient's primary care physician. If unable to reach primary care physician or do not receive response within 30 minutes, please contact [facility] for clinical staff order  clarification    Miscellaneous   Routine Skin for Bedridden Patients: Instruct patient/caregiver to apply moisture barrier cream to all skin folds and wet areas in perineal area daily and after baths and all bowel movements.    Home Health Aide:  Nursing Physical Therapy  Occupational Therapy and Home Health Aide     Wound Care Orders  no    I certify that this patient is confined to her home and needs intermittent skilled nursing care, physical therapy, and occupational therapy.    BARON MASSEY, APRN  6/5/2023

## 2023-06-05 NOTE — TREATMENT PLAN
[facility]hab [facility]' DME recommendations    Lacey Mcelroy  MRN: [id number]    [x]  No DME needed      [x] Home health PT and OT        Antoinette Cali, PT 6/5/2023

## 2023-06-05 NOTE — PROGRESS NOTES
[facility]                                  Skilled Nursing Facility                   Progress Note     Patient Name: Lacey Mcelroy  YOB: 1940  MRN: [id number]  Room: [place] [address] [address] A     Admit Date: 6/2/2023   ANIBAL: 6/6/2023     Principal Problem: deconditioning    HPI obtained from patient interview and chart review     Chief Complaint: Re-evaluation of medical treatment and therapy status, lab review, discharge orders    HPI:   Lacey Mcelroy is a 82 y.o. female with a PMHx  HTN, syncope, osteoporosis, compression fractures, chronic left shoulder pain, CKD3b s/p mechanical fall. Cardiac causes ruled out. She complained of pain in her right hip, right elbow, lower back, and left leg.  X rays taken in the ED were negative for any acute fractures or dislocations but noted old fractures from previous falls in the thoracic and lumber spine.  CT Head and c-spine were negative. Discharged from ED but daughter was unable to transfer her from car into the home and returned to ED. Repeat XR negative for acute fractures. PT/OT and cleared for discharge to SNF for further therapy.    Patient will be treated at [facility] with PT and OT to improve functional status and ability to perform ADLs.     Interval History  All of today's labs reviewed and are listed below. K 3.4 give KCL x1. 24 hr vital sign ranges listed below.  Elevated -200 today, clonidine 0.1mg given x1 with relief now 162/58. Can follow-up with PCP. Patient refused multiple medications today after several attempts saying she was nauseated then refused Zofran and said she wasn't. Will discontinue vit C, pepcid. UA negative. Tolerated room air today 93%. IDT meeting with daughter wants to discharge AMA tomorrow will place  orders.     Past Medical History: Patient has a past medical history of Frozen shoulder, History of atrial fibrillation, History of torn  meniscus of left knee, Hypertension, and Osteoporosis.    Past Surgical History: Patient has a past surgical history that includes Cholecystectomy (2008) and Ankle fracture surgery (2009).    Social History: Patient reports that she has never smoked. She has never used smokeless tobacco. She reports that she does not drink alcohol.    Family History:  family history includes Diabetes type II in her sister; Heart disease in her brother, father, and sister; Hypertension in her brother, father, sister, and sister; Peripheral vascular disease in her brother; Rheumatic fever in her sister.    Allergies: Patient is allergic to darvocet a500 [propoxyphene n-acetaminophen], lortab [hydrocodone-acetaminophen], and tramadol.    ROS    Constitutional:  Negative for fever.   HENT:  Negative for sore throat.    Eyes:  Negative for redness.   Respiratory:  Negative for shortness of breath.    Cardiovascular:  Negative for chest pain.   Gastrointestinal:  Negative for nausea.   Genitourinary:  Positive for dysuria.   Musculoskeletal: Positive for back pain with movement only.   Skin:  Negative for rash.   Neurological:  Positive for weakness.   Hematological:  Does not bruise/bleed easily.   Psychiatric/Behavioral:  Denies being nervous/anxious.     24 hour Vital Sign Range   Temp:  [97.6 °F (36.4 °C)-98.7 °F (37.1 °C)]   Pulse:  [80-97]   Resp:  [17]   BP: (128-199)/(58-86)   SpO2:  [96 %-99 %]       Physical Exam  General: Lethargic, dozing off while sitting up, frail, elderly  HENT: Atraumatic, normocephalic, EOMI and PERRLA, no scleral icterus, MMM, no nasal discharge. Hard of hearing  Neck: Midline trachea, no obvious goiter or masses  CV: RRR; no murmurs, rubs, or gallops. Normal s1, s2  Resp: CTAB with normal work of breathing and chest excursion. No rhonchi, rales, or wheezing appreciated, on room air  Abd: Soft, NTND. No organomegaly or masses appreciated upon palpation.   MSK: +2 radial pulses b/l. No edema, cyanosis, or  erythema noted on extremities. +kyphosis  Neuro: Oriented x2-3. CNII-XII grossly intact. Normal appearing coordination and sensory function.   Skin: No rashes or lesions noted.   Psych: Irritable, calm    Labs:  Recent Labs   Lab 06/01/23  0510 06/02/23 0447 06/05/23  0602   WBC 9.04 6.25 4.62   HGB 10.1* 9.7* 8.5*   HCT 30.6* 29.1* 27.1*   * 119* 169       Recent Labs   Lab 06/01/23  0510 06/02/23 0447 06/05/23  0602    138 141   K 4.2 3.5 3.4*    105 106   CO2 21* 22* 28   BUN 30* 25* 24*   CREATININE 1.1 1.0 0.9   * 99 91   CALCIUM 8.9 8.8 9.1   MG 1.3* 2.2 1.6   PHOS 3.4 3.0 3.6       Recent Labs   Lab 05/31/23  1851 06/02/23 0447   ALKPHOS 88 84   ALT 16 16   AST 16 23   ALBUMIN 3.5 3.0*   PROT 6.6 6.0   BILITOT 0.5 0.7       No results for input(s): POCTGLUCOSE in the last 72 hours.    Meds Scheduled:   acetaminophen  1,000 mg Oral Q8H    ascorbic acid (vitamin C)  500 mg Oral BID    celecoxib  100 mg Oral Daily    cyanocobalamin  1,000 mcg Oral Daily    famotidine  20 mg Oral Daily    ferrous sulfate  1 tablet Oral Every other day    LIDOcaine  3 patch Transdermal Q24H    methyl salicylate-menthol 15-10%   Topical (Top) TID    multivitamin  1 tablet Oral Daily    polyethylene glycol  17 g Oral Daily    senna-docusate 8.6-50 mg  1 tablet Oral BID    vitamin D  2,000 Units Oral Daily    white petrolatum-mineral oil 57.3-42.5%   Both Eyes QHS       PRN:   calcium carbonate, cloNIDine, HYDROcodone-acetaminophen, ibuprofen, melatonin, ondansetron      Assessment and Plan:    Mechanical Fall  Hx Syncopal episodes  Hx multiple falls  Neuropathic pain  Imaging no acute fractures, multiple compression fractures of indeterminate age  Cardiac causes ruled out, tripped over cane at home  Opioid naive, required O2 after receiving dilaudid 86% sat  Tylenol, ibuprofen effective at home for years  6/5/2023  Continue home dose ibuprofen 200mg q8h PRN breakthrough pain and scheduled APAP 1000mg  q8h. Change oxy to Norco PRN BID for severe pain unrelieved by other meds. Changed lidoderm to muscle rub  Initiate low dose celebrex daily.  Fall precautions    Confusion  Lethargy  6/5/2023  Avoid opioids  UA C&S ordered     Normocytic anemia  Hgb 9.7 on admit  Iron panel shows iron 13, TIBC 339, ferritin 397, consistent with AoCI   B12 244 and folate 6.1, continue PO supplement     CKD stage 3b  Monitor kidney function on routine labs  Long term NSAID use, ok to continue based on admit labs     Osteoporosis  Refused outpatient medication/treatment previously  High risk for fractures; x rays negative for acute fractures this admission, but evidence of remote fractures in thoracic and lumbar spine noted  Continue Vitamin D supplementation and follow up with PCP   Fall precautions    Hypokalemia  3.4 today give KCL 20meq x1     Anticipate disposition:    ANGELICA tomorrow with Home with home health, lives next to her daughter, states she will provide care      Follow-up needed during SNF admission:   none    Follow-up needed after discharge from SNF:   - PCP within 1-2 weeks  - See appt scheduled below   -Dr. Vázquez orthopedic surgeon if desired as previously recommended    No future appointments.      I certify that SNF services are required to be given on an inpatient basis because Lacey Mcelroy needs for skilled nursing care and/or skilled rehabilitation are required on a daily basis and such services can only practically be provided in a skilled nursing facility setting and are for an ongoing condition for which she received inpatient care in the hospital.       Extended Visit:   Total time spent: 100 minutes  Non Face to Face Time: 90 minutes   Description of Time: counseling provided on clinical condition, therapies provided, plan of care, emotional support, coordinating patient care with other care team members, reviewing and interpreting labs and imaging, collaboration with physician, initiating new orders,  chart review, and documentation. See interval hx.         CANDIE NOONAN  Department of Hospital Medicine   [facility]     DOS: 6/5/2023       Patient note was created using MModal Dictation.  Any errors in syntax or even information may not have been identified and edited on initial review prior to signing this note.

## 2023-06-05 NOTE — PLAN OF CARE
Problem: Occupational Therapy  Goal: Occupational Therapy Goal  Description: Goals to be met by: 07/03/23     Patient will increase functional independence with ADLs by performing:    UE Dressing with Modified [place].  LE Dressing with Minimal Assistance and Assistive Devices as needed.  Grooming while seated at sink with Modified [place].  Toileting from bedside commode with Minimal Assistance for hygiene and clothing management.   Bathing from  sitting at sink with Minimal Assistance for LB bathing (spinal precautions).  Toilet transfer to bedside commode with Contact Guard Assistance.  Upper extremity exercise program 3x12 reps per handout, with supervision to improve func mobility skills.  Stand for 10' during functional task with CGA in preparation for standing ADLs.     Outcome: Ongoing, Progressing

## 2023-06-05 NOTE — CONSULTS
[facility] - Skilled Nursing  Adult Nutrition  Consult Note    SUMMARY   Recommendations  Continue regular diet, double meats, RD following  Goals: PO to meet assessed needs by next RD fu  Nutrition Goal Status: new  Communication of RD Recs: other (comment) (POC)    Assessment and Plan  Increased calorie needs for weight gain as evidenced by BMI 19,    Plan  General diet  Assist with set up  Increased protein diet- double meats  Collaboration with other providers      Malnutrition Assessment    6/5/23                  Catholic Region (Muscle Loss): moderate depletion  Clavicle Bone Region (Muscle Loss): moderate depletion  Clavicle and Acromion Bone Region (Muscle Loss): severe depletion  Dorsal Hand (Muscle Loss): moderate depletion  Anterior Thigh Region (Muscle Loss): moderate depletion  Posterior Calf Region (Muscle Loss): moderate depletion                 Reason for Assessment    Reason For Assessment: consult  Diagnosis:  (s/p fall due to syncope)  Relevant Medical History: HTN, CKD 3, AFIB, Anemia, frozen shoulder, hard of hearing, deconditoning, syncope, Osteoporosis,  Interdisciplinary Rounds: attended    General Information Comments: Family staying in room with patient, they want to keep the double meats, they want to dc as soon as possible to daughter's home,. Attended IDT rounds today. NFPE incomplete. There is outside food in room, she is getting assistance from family with set up.    Nutrition Discharge Planning: Dc on high calorie diet with ONS of choice for weight gain    Nutrition/Diet History    Patient Reported Diet/Restrictions/Preferences: general  Spiritual, Cultural Beliefs, Druze Practices, Values that Affect Care: no  Supplemental Drinks or Food Habits: Premier Protein, Ensure Original  Food Allergies: NKFA  Factors Affecting Nutritional Intake: pain    Anthropometrics    Temp: 97.6 °F (36.4 °C)  Height: 5' (152.4 cm)  Height (inches): 60 in  Weight Method: Estimated  Weight: 45.5 kg  (100 lb 6.4 oz)  Weight (lb): 100.4 lb  Ideal Body Weight (IBW), Female: 100 lb  % Ideal Body Weight, Female (lb): 100.4 %  BMI (Calculated): 19.6  BMI Grade: 18.5-24.9 - normal  Usual Body Weight (UBW), kg:  (90's per chart review)       Lab/Procedures/Meds    Pertinent Labs Reviewed: reviewed  Pertinent Labs Comments: Hg 8.5, Hct 27.1, BUN 25, GFR 56, albumin 3.0  Pertinent Medications Reviewed: reviewed  Pertinent Medications Comments: Vit C, Abx, famotidine, Fe, Vit B12, Vit D, senna-docusate, MVI, polyethlyene glycol, lactulose    Estimated/Assessed Needs    Weight Used For Calorie Calculations: 45.5 kg (100 lb 5 oz)  Energy Calorie Requirements (kcal): 1074  Energy Need Method: [place]-St Jeor (x 1.3(PAL) + 250 for weight gain)  Protein Requirements: 55  Weight Used For Protein Calculations: 45.5 kg (100 lb 5 oz) (x 1.2g/kg)        RDA Method (mL): 1074     Nutrition Prescription Ordered  Current Diet Order: Regular, double meat    Evaluation of Received Nutrient/Fluid Intake  I/O: no data  Energy Calories Required: meeting needs  Protein Required: meeting needs  Fluid Required: meeting needs  Comments: LBM 6/5  Tolerance: tolerating  % Intake of Estimated Energy Needs: 75 - 100 %  % Meal Intake: 50 - 75 %    Nutrition Risk    Level of Risk/Frequency of Follow-up: low (one time per week)       Monitor and Evaluation    Food and Nutrient Intake: food and beverage intake  Food and Nutrient Adminstration: diet order  Physical Activity and Function: nutrition-related ADLs and IADLs  Anthropometric Measurements: weight change  Biochemical Data, Medical Tests and Procedures: electrolyte and renal panel, gastrointestinal profile       Nutrition Follow-Up    RD Follow-up?: Yes

## 2023-06-06 ENCOUNTER — PATIENT OUTREACH (OUTPATIENT)
Dept: ADMINISTRATIVE | Facility: CLINIC | Age: 83
End: 2023-06-06
Payer: MEDICARE

## 2023-06-06 NOTE — PROGRESS NOTES
C3 nurse attempted to contact Lacey Mcelroy   for a TCC post hospital discharge follow up call. No answer at phone number listed. Left voice mail.

## 2023-06-06 NOTE — DISCHARGE SUMMARY
[facility]   Skilled Nursing Facility   Discharge Summary  Patient Name: Lacey Mcelroy  : 1940  MRN: [id number]  Attending Physician: Dr Cardona  Nurse Practitioner: CANDIE NOONNA    Admit Date: 2023   Date of Discharge:  2023  Length of Stay:  LOS: 3 days     Date of Service: 2023    Principal Problem: Accident due to mechanical fall without injury    HPI  Lacey Mcelroy is a 82 y.o. female with a PMHx  HTN, syncope, osteoporosis, compression fractures, chronic left shoulder pain, CKD3b s/p mechanical fall. Cardiac causes ruled out. She complained of pain in her right hip, right elbow, lower back, and left leg.  X rays taken in the ED were negative for any acute fractures or dislocations but noted old fractures from previous falls in the thoracic and lumber spine.  CT Head and c-spine were negative. Discharged from ED but daughter was unable to transfer her from car into the home and returned to ED. Repeat XR negative for acute fractures. PT/OT and discharged to SNF for further therapy.    Hospital Course  Patient progressed with PT and OT- last PT note states that patient ambulated amb with RW CGA ~ 13 ft no LOB slow sonya, dec B step length, vcs for inc BUE support to off load LE and vcs for getting closer to RW. Patient had no significant events during their stay at SNF. Home health set up by case management. Patient's daughter signed the patient out AMA on  at [id number]. New meds were sent to pharmacy yesterday. Follow up appointment to be made by patient within one week. All prescriptions and discharge instructions were ordered to be given to the patient prior to discharge.     Physical Exam      Recent Labs   Lab 23  0510 23  0447 23  0602   WBC 9.04 6.25 4.62   HGB 10.1* 9.7* 8.5*   HCT 30.6* 29.1* 27.1*   * 119* 169     Recent Labs   Lab 23  0510 23  0447 23  0602    138 141   K 4.2 3.5 3.4*    105 106   CO2 21*  22* 28   BUN 30* 25* 24*   CREATININE 1.1 1.0 0.9   * 99 91   CALCIUM 8.9 8.8 9.1   MG 1.3* 2.2 1.6   PHOS 3.4 3.0 3.6     Recent Labs   Lab 05/31/23  1851 06/02/23  0447   ALKPHOS 88 84   ALT 16 16   AST 16 23   ALBUMIN 3.5 3.0*   PROT 6.6 6.0   BILITOT 0.5 0.7      No results for input(s): CPK, CPKMB, MB, TROPONINI in the last 72 hours.  No results for input(s): LACTATE in the last 72 hours.    No results for input(s): POCTGLUCOSE in the last 168 hours.   No results found for: HGBA1C      Discharge Medication List as of 6/5/2023  7:52 PM        START taking these medications    Details   celecoxib (CELEBREX) 100 MG capsule Take 1 capsule (100 mg total) by mouth once daily., Starting Tue 6/6/2023, Normal      methyl salicylate-menthol 15-10% 15-10 % Crea Apply topically 3 (three) times daily. Apply to right knee and lower back, Starting Mon 6/5/2023, Normal      polyethylene glycol (GLYCOLAX) 17 gram PwPk Take 17 g by mouth once daily. For constipation, hold for loose stool, Starting Tue 6/6/2023, Normal           CONTINUE these medications which have NOT CHANGED    Details   acetaminophen (TYLENOL) 500 MG tablet Take 500 mg by mouth every 6 (six) hours as needed for Pain., Historical Med      cholecalciferol, vitamin D3, (VITAMIN D3) 50 mcg (2,000 unit) Cap Take 1 capsule by mouth once daily., Historical Med      cyanocobalamin (VITAMIN B-12) 1000 MCG tablet Take 1 tablet (1,000 mcg total) by mouth once daily., Starting Fri 6/2/2023, No Print      ferrous sulfate (FEOSOL) Tab tablet Take 1 tablet (1 each total) by mouth daily with breakfast., Starting Thu 11/11/2021, Normal      ibuprofen (ADVIL,MOTRIN) 200 MG tablet Take 200 mg by mouth every 6 (six) hours as needed for Pain., Historical Med      MINERAL OIL/PETROLATUM,WHITE (REFRESH P.M. OPHT) Apply to eye nightly., Until Discontinued, Historical Med      MULTIVIT &MINERALS/FERROUS FUM (MULTI VITAMIN ORAL) Take by mouth., Until Discontinued, Historical  Med           STOP taking these medications       gabapentin (NEURONTIN) 100 MG capsule Comments:   Reason for Stopping:         LIDOcaine (LIDODERM) 5 % Comments:   Reason for Stopping:         PROPYLENE GLYCOL//PF (SYSTANE, PF, OPHT) Comments:   Reason for Stopping:               Discharge Diet:regular diet with Thin liquids    Activity: activity as tolerated    Discharge Condition: Stable     Disposition: Home or Self Care with . Patient left AMA    No future appointments. Daughter will arrange.    <30 minutes total time spent on the discharge of the patient including review of hospital course with the patient, reviewing discharge medications, and arranging follow-up care.      CANDIE NOONAN  [facility]   Skilled Nursing [facility]

## 2023-06-06 NOTE — NURSING
Patient refusing care, Daughter at bedside patient has requested to leave facility AMA. Patient and daughter educated on risk of leaving AMA, risks acknowledged by family and patient. Assisted with packing personal belongings, assisted to personal car.

## 2023-06-07 NOTE — PROGRESS NOTES
C3 nurse spoke with Lacey Mcelroy's daughter  Angella Munroe for a TCC post hospital discharge follow up call. The patient has a scheduled virtual HOSFU appointment with Erica Valladares MD on 6/7/23 @ 6686.